# Patient Record
Sex: FEMALE | Race: WHITE | NOT HISPANIC OR LATINO | URBAN - METROPOLITAN AREA
[De-identification: names, ages, dates, MRNs, and addresses within clinical notes are randomized per-mention and may not be internally consistent; named-entity substitution may affect disease eponyms.]

---

## 2023-03-03 PROBLEM — M47.812 CERVICAL SPONDYLOSIS WITHOUT MYELOPATHY: Status: ACTIVE | Noted: 2023-03-03

## 2023-03-03 PROBLEM — F41.9 ANXIETY: Status: ACTIVE | Noted: 2023-03-03

## 2023-03-03 PROBLEM — M54.2 NECK PAIN, BILATERAL: Status: ACTIVE | Noted: 2023-03-03

## 2023-03-03 PROBLEM — M25.50 ARTHRALGIA OF MULTIPLE SITES: Status: ACTIVE | Noted: 2023-03-03

## 2023-03-03 PROBLEM — F32.A DEPRESSION: Status: ACTIVE | Noted: 2023-03-03

## 2023-03-03 PROBLEM — D64.9 ANEMIA: Status: ACTIVE | Noted: 2023-03-03

## 2023-03-03 PROBLEM — F98.8 ATTENTION DEFICIT DISORDER WITHOUT HYPERACTIVITY: Status: ACTIVE | Noted: 2023-03-03

## 2023-03-03 PROBLEM — M12.9 ARTHROPATHY: Status: ACTIVE | Noted: 2023-03-03

## 2023-03-03 PROBLEM — J02.0 ACUTE STREPTOCOCCAL PHARYNGITIS: Status: ACTIVE | Noted: 2023-03-03

## 2023-03-03 PROBLEM — E11.9 NEW ONSET TYPE 2 DIABETES MELLITUS (MULTI): Status: ACTIVE | Noted: 2023-03-03

## 2023-03-03 PROBLEM — E78.5 HYPERLIPEMIA: Status: ACTIVE | Noted: 2023-03-03

## 2023-03-03 PROBLEM — M51.26 PROTRUSION OF LUMBAR INTERVERTEBRAL DISC: Status: ACTIVE | Noted: 2023-03-03

## 2023-03-03 PROBLEM — J02.9 SORE THROAT: Status: ACTIVE | Noted: 2023-03-03

## 2023-03-03 PROBLEM — G62.9 NEUROPATHY: Status: ACTIVE | Noted: 2023-03-03

## 2023-03-03 PROBLEM — R76.8 ANA POSITIVE: Status: ACTIVE | Noted: 2023-03-03

## 2023-03-03 PROBLEM — I10 HYPERTENSION: Status: ACTIVE | Noted: 2023-03-03

## 2023-03-03 PROBLEM — F40.240 CLAUSTROPHOBIA: Status: ACTIVE | Noted: 2023-03-03

## 2023-03-03 PROBLEM — E55.9 VITAMIN D DEFICIENCY, UNSPECIFIED: Status: ACTIVE | Noted: 2023-03-03

## 2023-03-03 PROBLEM — M47.816 LUMBAR SPONDYLOSIS: Status: ACTIVE | Noted: 2023-03-03

## 2023-03-03 PROBLEM — M41.9 SCOLIOSIS: Status: ACTIVE | Noted: 2023-03-03

## 2023-03-03 PROBLEM — M54.6 THORACIC SPINE PAIN: Status: ACTIVE | Noted: 2023-03-03

## 2023-03-03 PROBLEM — M54.50 LUMBAR PAIN: Status: ACTIVE | Noted: 2023-03-03

## 2023-03-03 PROBLEM — M25.511 RIGHT SHOULDER PAIN: Status: ACTIVE | Noted: 2023-03-03

## 2023-03-03 RX ORDER — FLUOXETINE HYDROCHLORIDE 20 MG/1
1 CAPSULE ORAL
COMMUNITY
Start: 2014-01-29 | End: 2023-03-21 | Stop reason: SDUPTHER

## 2023-03-03 RX ORDER — PRAVASTATIN SODIUM 40 MG/1
1 TABLET ORAL DAILY
COMMUNITY
Start: 2021-07-23 | End: 2023-03-21 | Stop reason: SDUPTHER

## 2023-03-03 RX ORDER — BLOOD-GLUCOSE TRANSMITTER
EACH MISCELLANEOUS
COMMUNITY
End: 2024-05-30 | Stop reason: ALTCHOICE

## 2023-03-03 RX ORDER — METFORMIN HYDROCHLORIDE 500 MG/1
0.5 TABLET ORAL 2 TIMES DAILY
COMMUNITY
Start: 2021-10-21 | End: 2023-03-21 | Stop reason: SDUPTHER

## 2023-03-03 RX ORDER — BLOOD-GLUCOSE SENSOR
EACH MISCELLANEOUS
COMMUNITY
End: 2024-05-30 | Stop reason: ALTCHOICE

## 2023-03-03 RX ORDER — CELECOXIB 200 MG/1
1 CAPSULE ORAL 2 TIMES DAILY PRN
COMMUNITY
Start: 2021-10-21 | End: 2024-05-30 | Stop reason: ALTCHOICE

## 2023-03-03 RX ORDER — LOSARTAN POTASSIUM AND HYDROCHLOROTHIAZIDE 25; 100 MG/1; MG/1
1 TABLET ORAL
COMMUNITY
Start: 2019-10-16 | End: 2023-03-21 | Stop reason: SDUPTHER

## 2023-03-20 ENCOUNTER — LAB (OUTPATIENT)
Dept: LAB | Facility: LAB | Age: 63
End: 2023-03-20
Payer: COMMERCIAL

## 2023-03-20 DIAGNOSIS — E11.9 NEW ONSET TYPE 2 DIABETES MELLITUS (MULTI): ICD-10-CM

## 2023-03-20 DIAGNOSIS — E78.2 MIXED HYPERLIPIDEMIA: ICD-10-CM

## 2023-03-20 DIAGNOSIS — Z00.00 ANNUAL PHYSICAL EXAM: Primary | ICD-10-CM

## 2023-03-20 DIAGNOSIS — Z00.00 ANNUAL PHYSICAL EXAM: ICD-10-CM

## 2023-03-20 LAB
ALANINE AMINOTRANSFERASE (SGPT) (U/L) IN SER/PLAS: 20 U/L (ref 7–45)
ALBUMIN (G/DL) IN SER/PLAS: 4.2 G/DL (ref 3.4–5)
ALBUMIN (MG/L) IN URINE: 31.6 MG/L
ALBUMIN/CREATININE (UG/MG) IN URINE: 14.7 UG/MG CRT (ref 0–30)
ALKALINE PHOSPHATASE (U/L) IN SER/PLAS: 39 U/L (ref 33–136)
ANION GAP IN SER/PLAS: 12 MMOL/L (ref 10–20)
ASPARTATE AMINOTRANSFERASE (SGOT) (U/L) IN SER/PLAS: 17 U/L (ref 9–39)
BASOPHILS (10*3/UL) IN BLOOD BY AUTOMATED COUNT: 0.03 X10E9/L (ref 0–0.1)
BASOPHILS/100 LEUKOCYTES IN BLOOD BY AUTOMATED COUNT: 0.7 % (ref 0–2)
BILIRUBIN TOTAL (MG/DL) IN SER/PLAS: 0.7 MG/DL (ref 0–1.2)
CALCIUM (MG/DL) IN SER/PLAS: 9 MG/DL (ref 8.6–10.3)
CARBON DIOXIDE, TOTAL (MMOL/L) IN SER/PLAS: 29 MMOL/L (ref 21–32)
CHLORIDE (MMOL/L) IN SER/PLAS: 103 MMOL/L (ref 98–107)
CHOLESTEROL (MG/DL) IN SER/PLAS: 175 MG/DL (ref 0–199)
CHOLESTEROL IN HDL (MG/DL) IN SER/PLAS: 41.6 MG/DL
CHOLESTEROL/HDL RATIO: 4.2
CREATININE (MG/DL) IN SER/PLAS: 0.69 MG/DL (ref 0.5–1.05)
CREATININE (MG/DL) IN URINE: 215 MG/DL (ref 20–320)
EOSINOPHILS (10*3/UL) IN BLOOD BY AUTOMATED COUNT: 0.14 X10E9/L (ref 0–0.7)
EOSINOPHILS/100 LEUKOCYTES IN BLOOD BY AUTOMATED COUNT: 3.1 % (ref 0–6)
ERYTHROCYTE DISTRIBUTION WIDTH (RATIO) BY AUTOMATED COUNT: 13 % (ref 11.5–14.5)
ERYTHROCYTE MEAN CORPUSCULAR HEMOGLOBIN CONCENTRATION (G/DL) BY AUTOMATED: 31.3 G/DL (ref 32–36)
ERYTHROCYTE MEAN CORPUSCULAR VOLUME (FL) BY AUTOMATED COUNT: 90 FL (ref 80–100)
ERYTHROCYTES (10*6/UL) IN BLOOD BY AUTOMATED COUNT: 4.61 X10E12/L (ref 4–5.2)
GFR FEMALE: >90 ML/MIN/1.73M2
GLUCOSE (MG/DL) IN SER/PLAS: 98 MG/DL (ref 74–99)
HEMATOCRIT (%) IN BLOOD BY AUTOMATED COUNT: 41.6 % (ref 36–46)
HEMOGLOBIN (G/DL) IN BLOOD: 13 G/DL (ref 12–16)
IMMATURE GRANULOCYTES/100 LEUKOCYTES IN BLOOD BY AUTOMATED COUNT: 0.2 % (ref 0–0.9)
LDL: 94 MG/DL (ref 0–99)
LEUKOCYTES (10*3/UL) IN BLOOD BY AUTOMATED COUNT: 4.5 X10E9/L (ref 4.4–11.3)
LYMPHOCYTES (10*3/UL) IN BLOOD BY AUTOMATED COUNT: 1.16 X10E9/L (ref 1.2–4.8)
LYMPHOCYTES/100 LEUKOCYTES IN BLOOD BY AUTOMATED COUNT: 26 % (ref 13–44)
MONOCYTES (10*3/UL) IN BLOOD BY AUTOMATED COUNT: 0.42 X10E9/L (ref 0.1–1)
MONOCYTES/100 LEUKOCYTES IN BLOOD BY AUTOMATED COUNT: 9.4 % (ref 2–10)
NEUTROPHILS (10*3/UL) IN BLOOD BY AUTOMATED COUNT: 2.7 X10E9/L (ref 1.2–7.7)
NEUTROPHILS/100 LEUKOCYTES IN BLOOD BY AUTOMATED COUNT: 60.6 % (ref 40–80)
PLATELETS (10*3/UL) IN BLOOD AUTOMATED COUNT: 227 X10E9/L (ref 150–450)
POTASSIUM (MMOL/L) IN SER/PLAS: 3.7 MMOL/L (ref 3.5–5.3)
PROTEIN TOTAL: 6.6 G/DL (ref 6.4–8.2)
SODIUM (MMOL/L) IN SER/PLAS: 140 MMOL/L (ref 136–145)
THYROTROPIN (MIU/L) IN SER/PLAS BY DETECTION LIMIT <= 0.05 MIU/L: 1.55 MIU/L (ref 0.44–3.98)
TRIGLYCERIDE (MG/DL) IN SER/PLAS: 196 MG/DL (ref 0–149)
UREA NITROGEN (MG/DL) IN SER/PLAS: 18 MG/DL (ref 6–23)
VLDL: 39 MG/DL (ref 0–40)

## 2023-03-20 PROCEDURE — 85025 COMPLETE CBC W/AUTO DIFF WBC: CPT

## 2023-03-20 PROCEDURE — 80061 LIPID PANEL: CPT

## 2023-03-20 PROCEDURE — 82570 ASSAY OF URINE CREATININE: CPT

## 2023-03-20 PROCEDURE — 82043 UR ALBUMIN QUANTITATIVE: CPT

## 2023-03-20 PROCEDURE — 36415 COLL VENOUS BLD VENIPUNCTURE: CPT

## 2023-03-20 PROCEDURE — 84443 ASSAY THYROID STIM HORMONE: CPT

## 2023-03-20 PROCEDURE — 83036 HEMOGLOBIN GLYCOSYLATED A1C: CPT

## 2023-03-20 PROCEDURE — 80053 COMPREHEN METABOLIC PANEL: CPT

## 2023-03-21 ENCOUNTER — OFFICE VISIT (OUTPATIENT)
Dept: PRIMARY CARE | Facility: CLINIC | Age: 63
End: 2023-03-21
Payer: COMMERCIAL

## 2023-03-21 VITALS
HEART RATE: 70 BPM | WEIGHT: 146 LBS | DIASTOLIC BLOOD PRESSURE: 80 MMHG | SYSTOLIC BLOOD PRESSURE: 132 MMHG | BODY MASS INDEX: 25.06 KG/M2

## 2023-03-21 DIAGNOSIS — Z00.00 ENCOUNTER FOR ANNUAL HEALTH EXAMINATION: Primary | ICD-10-CM

## 2023-03-21 DIAGNOSIS — F34.1 PERSISTENT DEPRESSIVE DISORDER: ICD-10-CM

## 2023-03-21 DIAGNOSIS — E11.9 NEW ONSET TYPE 2 DIABETES MELLITUS (MULTI): ICD-10-CM

## 2023-03-21 DIAGNOSIS — F41.9 ANXIETY: ICD-10-CM

## 2023-03-21 DIAGNOSIS — I10 PRIMARY HYPERTENSION: ICD-10-CM

## 2023-03-21 DIAGNOSIS — E78.2 MIXED HYPERLIPIDEMIA: ICD-10-CM

## 2023-03-21 DIAGNOSIS — R19.7 DIARRHEA, UNSPECIFIED TYPE: ICD-10-CM

## 2023-03-21 DIAGNOSIS — Z12.31 BREAST CANCER SCREENING BY MAMMOGRAM: ICD-10-CM

## 2023-03-21 PROBLEM — J02.9 SORE THROAT: Status: RESOLVED | Noted: 2023-03-03 | Resolved: 2023-03-21

## 2023-03-21 PROBLEM — M54.50 LUMBAR PAIN: Status: RESOLVED | Noted: 2023-03-03 | Resolved: 2023-03-21

## 2023-03-21 PROBLEM — F40.240 CLAUSTROPHOBIA: Status: RESOLVED | Noted: 2023-03-03 | Resolved: 2023-03-21

## 2023-03-21 PROBLEM — J02.0 ACUTE STREPTOCOCCAL PHARYNGITIS: Status: RESOLVED | Noted: 2023-03-03 | Resolved: 2023-03-21

## 2023-03-21 PROBLEM — D64.9 ANEMIA: Status: RESOLVED | Noted: 2023-03-03 | Resolved: 2023-03-21

## 2023-03-21 PROBLEM — M54.2 NECK PAIN, BILATERAL: Status: RESOLVED | Noted: 2023-03-03 | Resolved: 2023-03-21

## 2023-03-21 PROBLEM — M54.6 THORACIC SPINE PAIN: Status: RESOLVED | Noted: 2023-03-03 | Resolved: 2023-03-21

## 2023-03-21 LAB
ESTIMATED AVERAGE GLUCOSE FOR HBA1C: 134 MG/DL
HEMOGLOBIN A1C/HEMOGLOBIN TOTAL IN BLOOD: 6.3 %

## 2023-03-21 PROCEDURE — 3075F SYST BP GE 130 - 139MM HG: CPT | Performed by: FAMILY MEDICINE

## 2023-03-21 PROCEDURE — 1036F TOBACCO NON-USER: CPT | Performed by: FAMILY MEDICINE

## 2023-03-21 PROCEDURE — 3079F DIAST BP 80-89 MM HG: CPT | Performed by: FAMILY MEDICINE

## 2023-03-21 PROCEDURE — 3044F HG A1C LEVEL LT 7.0%: CPT | Performed by: FAMILY MEDICINE

## 2023-03-21 PROCEDURE — 93000 ELECTROCARDIOGRAM COMPLETE: CPT | Performed by: FAMILY MEDICINE

## 2023-03-21 PROCEDURE — 99214 OFFICE O/P EST MOD 30 MIN: CPT | Performed by: FAMILY MEDICINE

## 2023-03-21 PROCEDURE — 99396 PREV VISIT EST AGE 40-64: CPT | Performed by: FAMILY MEDICINE

## 2023-03-21 RX ORDER — METFORMIN HYDROCHLORIDE 500 MG/1
250 TABLET ORAL
Qty: 90 TABLET | Refills: 1 | Status: SHIPPED | OUTPATIENT
Start: 2023-03-21 | End: 2023-11-28 | Stop reason: SDUPTHER

## 2023-03-21 RX ORDER — PRAVASTATIN SODIUM 40 MG/1
40 TABLET ORAL DAILY
Qty: 90 TABLET | Refills: 1 | Status: SHIPPED | OUTPATIENT
Start: 2023-03-21 | End: 2023-11-28 | Stop reason: SDUPTHER

## 2023-03-21 RX ORDER — FLUOXETINE HYDROCHLORIDE 20 MG/1
20 CAPSULE ORAL
Qty: 90 CAPSULE | Refills: 1 | Status: SHIPPED | OUTPATIENT
Start: 2023-03-21 | End: 2023-10-24 | Stop reason: SDUPTHER

## 2023-03-21 RX ORDER — LOSARTAN POTASSIUM AND HYDROCHLOROTHIAZIDE 25; 100 MG/1; MG/1
1 TABLET ORAL
Qty: 90 TABLET | Refills: 1 | Status: SHIPPED | OUTPATIENT
Start: 2023-03-21 | End: 2023-10-30 | Stop reason: SDUPTHER

## 2023-03-21 ASSESSMENT — PATIENT HEALTH QUESTIONNAIRE - PHQ9
2. FEELING DOWN, DEPRESSED OR HOPELESS: NOT AT ALL
SUM OF ALL RESPONSES TO PHQ9 QUESTIONS 1 AND 2: 0
1. LITTLE INTEREST OR PLEASURE IN DOING THINGS: NOT AT ALL

## 2023-03-21 NOTE — RESULT ENCOUNTER NOTE
Results were reviewed today with Dr. Scott at her office visit/physical examination.  See chart note for complete details

## 2023-03-21 NOTE — PROGRESS NOTES
Subjective   Haleigh Pretty is a 62 y.o. female who presents for annual health maintenance.    HPI  Health Maintenance:   Pap: believes this was done about 3-5 years ago but cannot recall  Mammogram: last done 2021  Colonoscopy: last done 2015 with 10 year recommended repeat   Immunizations: due for shingles   Sees dentist and eye doctor regularly   EtOH use: 4 drinks per week   Never smoker    Diarrhea:  Reports diarrhea for last 2 weeks   Currently having BMs about twice daily   Denies abdominal pain, cramping, nausea, vomiting, melena or hematochezia   Notes fluctuation between diarrhea and constipation     ROS:   All pertinent positive symptoms are included in the history of present illness.  All other systems have been reviewed and are negative and noncontributory to this patient's current ailments.    Objective     /80   Pulse 70   Wt 66.2 kg (146 lb)   BMI 25.06 kg/m²     Physical Exam  CONSTITUTIONAL - well nourished, well developed, looks like stated age, in no acute distress, not ill-appearing, and not tired appearing  SKIN - normal skin color and pigmentation, normal skin turgor without rash, lesions, or nodules visualized  HEAD - no trauma, normocephalic  EYES - pupils are equal and reactive to light, extraocular muscles are intact, and normal external exam  CHEST - clear to auscultation, no wheezing, no crackles and no rales, good effort  CARDIAC - regular rate and regular rhythm, no skipped beats, no murmur  ABDOMEN - no organomegaly, soft, nontender, nondistended, normal bowel sounds, no guarding/rebound/rigidity, negative McBurney sign and negative Torres sign  EXTREMITIES - no obvious or evident edema, no obvious or evident deformities  NEUROLOGICAL - normal gait, normal balance, normal motor, no ataxia, alert, oriented   PSYCHIATRIC - alert, pleasant and cordial, age-appropriate  IMMUNOLOGIC - no cervical lymphadenopathy    Assessment/Plan   Encounter for annual health examination  Complete  history and physical examination was performed  EKG reveals sinus rhythm without acute changes  Breast cancer screening by mammogram  -     BI mammo bilateral screening tomosynthesis; Future  New onset type 2 diabetes mellitus (CMS/HCC)  Comments:  Well controlled with HgbA1c of 6.3  Nutrition referral placed per your request for further dietary advice  Orders:  -     Referral to Nutrition Services; Future  Diarrhea, unspecified type  Comments:  Discussed use of OTC fiber to help form and bulk the stool. Possibly due to IBS, therefore discussed trial of Low Fodmap diet. Hand out provided today    In addition to the diagnoses above, medication refills were sent for anxiety and depression, hypertension, and hyperlipidemia without change.  Blood work results were reviewed, BP looks to be normotensive, so no changes were thought to be warranted at this time

## 2023-03-27 ENCOUNTER — APPOINTMENT (OUTPATIENT)
Dept: PRIMARY CARE | Facility: CLINIC | Age: 63
End: 2023-03-27
Payer: COMMERCIAL

## 2023-10-24 DIAGNOSIS — F34.1 PERSISTENT DEPRESSIVE DISORDER: ICD-10-CM

## 2023-10-24 DIAGNOSIS — F41.9 ANXIETY: ICD-10-CM

## 2023-10-24 RX ORDER — FLUOXETINE HYDROCHLORIDE 20 MG/1
20 CAPSULE ORAL
Qty: 30 CAPSULE | Refills: 0 | Status: SHIPPED | OUTPATIENT
Start: 2023-10-24 | End: 2023-10-30

## 2023-10-30 ENCOUNTER — OFFICE VISIT (OUTPATIENT)
Dept: PRIMARY CARE | Facility: CLINIC | Age: 63
End: 2023-10-30
Payer: COMMERCIAL

## 2023-10-30 DIAGNOSIS — E11.9 NEW ONSET TYPE 2 DIABETES MELLITUS (MULTI): Primary | ICD-10-CM

## 2023-10-30 DIAGNOSIS — Z23 INFLUENZA VACCINE NEEDED: ICD-10-CM

## 2023-10-30 DIAGNOSIS — E78.2 MIXED HYPERLIPIDEMIA: ICD-10-CM

## 2023-10-30 DIAGNOSIS — F41.9 ANXIETY: ICD-10-CM

## 2023-10-30 DIAGNOSIS — I10 PRIMARY HYPERTENSION: ICD-10-CM

## 2023-10-30 PROCEDURE — 90471 IMMUNIZATION ADMIN: CPT | Performed by: FAMILY MEDICINE

## 2023-10-30 PROCEDURE — 99214 OFFICE O/P EST MOD 30 MIN: CPT | Performed by: FAMILY MEDICINE

## 2023-10-30 PROCEDURE — 3044F HG A1C LEVEL LT 7.0%: CPT | Performed by: FAMILY MEDICINE

## 2023-10-30 PROCEDURE — 90686 IIV4 VACC NO PRSV 0.5 ML IM: CPT | Performed by: FAMILY MEDICINE

## 2023-10-30 PROCEDURE — 1036F TOBACCO NON-USER: CPT | Performed by: FAMILY MEDICINE

## 2023-10-30 RX ORDER — ESCITALOPRAM OXALATE 10 MG/1
10 TABLET ORAL DAILY
Qty: 30 TABLET | Refills: 0 | Status: SHIPPED | OUTPATIENT
Start: 2023-10-30 | End: 2023-11-14 | Stop reason: SINTOL

## 2023-10-30 RX ORDER — LOSARTAN POTASSIUM AND HYDROCHLOROTHIAZIDE 25; 100 MG/1; MG/1
1 TABLET ORAL
Qty: 90 TABLET | Refills: 1 | Status: SHIPPED | OUTPATIENT
Start: 2023-10-30 | End: 2024-05-06 | Stop reason: SDUPTHER

## 2023-10-30 NOTE — ASSESSMENT & PLAN NOTE
Please have lab work done at your earliest convenience, we will call with results and make treatment recommendations accordingly  Continue pravastatin 40 mg for now

## 2023-10-30 NOTE — ASSESSMENT & PLAN NOTE
We will check an A1c with labs today and discuss the need to continue metformin  If you are still in the prediabetic range, I feel it is reasonable to treat with diet and exercise but we can also discuss initiation of GLP at that time as well if you are interested

## 2023-10-30 NOTE — PROGRESS NOTES
Subjective   Patient ID: Haleigh Pretty is a 63 y.o. female who presents for Anxiety, Hypertension, Hyperlipidemia, and Diabetes.    HPI  1.  Anxiety  Currently on Prozac 20 mg daily  Unsure if this medication helps her at all and interested in stopping it  However, feels like she should probably be on something for her anxiety    2.  Hypertension  Well-controlled on intake today  Taking losartan/hydrochlorothiazide 100-25 mg daily, tolerates well without side effects, requesting refill  Denies cardiopulmonary symptoms    3.  Hyperlipidemia  Triglycerides elevated at 196 from lipid panel in March 2023, otherwise WNL  Taking pravastatin 40 mg daily, tolerates well side effects, requesting refill    4.  T2DM  A1c 6.3% back in March 2023, previously 6.5%  Taking metformin 500 mg daily  Denies side effects but would like to stop taking this medication in favor of diet and exercise    Review of Systems  All pertinent positive symptoms are included in the history of present illness.    All other systems have been reviewed and are negative and noncontributory to this patient's current ailments.    Past Medical History:   Diagnosis Date    Abnormal findings on diagnostic imaging of other specified body structures     Abnormal radiographic examination    Influenza due to other identified influenza virus with other respiratory manifestations 01/05/2018    Influenza A    Laceration without foreign body of right hand, initial encounter 11/15/2017    Laceration of right hand without foreign body, initial encounter    Myositis, unspecified 07/02/2015    Myofascitis    Pain in thoracic spine 07/02/2015    Pain in thoracic spine    Personal history of other diseases of the digestive system 07/27/2018    History of fatty infiltration of liver    Personal history of other endocrine, nutritional and metabolic disease     History of hyperlipidemia    Personal history of other specified conditions 08/25/2017    History of palpitations     Sprain of unspecified parts of thorax, initial encounter 07/02/2015    Thoracic sprain    Strain of muscle, fascia and tendon at neck level, initial encounter 11/12/2019    Cervical strain     Past Surgical History:   Procedure Laterality Date    OTHER SURGICAL HISTORY  10/15/2014    Mastectomy For Gynecomastia Bilateral    TONSILLECTOMY  10/15/2014    Tonsillectomy     Social History     Tobacco Use    Smoking status: Never    Smokeless tobacco: Never     Family History   Problem Relation Name Age of Onset    JAKE disease Mother      Lymphoma Father       Allergies   Allergen Reactions    Propoxyphene N-Acetaminophen Unknown     Immunization History   Administered Date(s) Administered    Flu vaccine (IIV4), preservative free *Check age/dose* 10/30/2023    Influenza, injectable, MDCK, preservative free, quadrivalent 10/18/2018, 09/20/2019, 10/13/2020    Influenza, seasonal, injectable 10/30/1995, 10/20/1997, 10/24/2022    [employer] SARS-CoV-2 Vaccination 03/17/2021    Moderna SARS-CoV-2 Vaccination 10/24/2022    Pfizer Purple Cap SARS-CoV-2 11/27/2021    Tdap vaccine, age 7 year and older (BOOSTRIX) 01/24/2017, 08/03/2022     Current Outpatient Medications   Medication Instructions    blood-glucose sensor (Dexcom G6 Sensor) device Use as directed    celecoxib (CeleBREX) 200 mg capsule 1 capsule, oral, 2 times daily PRN    Dexcom G4 platinum transmitter (Dexcom G6 Transmitter) device Use as directed    escitalopram (LEXAPRO) 10 mg, oral, Daily    FreeStyle Stacy sensor system kit Use as directed    losartan-hydrochlorothiazide (Hyzaar) 100-25 mg tablet 1 tablet, oral, Every Day    metFORMIN (GLUCOPHAGE) 250 mg, oral, 2 times daily with meals    pravastatin (PRAVACHOL) 40 mg, oral, Daily, As directed     Objective   Visit Vitals  Smoking Status Never     No visits with results within 1 Month(s) from this visit.   Latest known visit with results is:   Lab on 03/20/2023   Component Date Value    TSH 03/20/2023 1.55      Cholesterol 03/20/2023 175     HDL 03/20/2023 41.6     Cholesterol/HDL Ratio 03/20/2023 4.2     LDL 03/20/2023 94     VLDL 03/20/2023 39     Triglycerides 03/20/2023 196 (H)     Glucose 03/20/2023 98     Sodium 03/20/2023 140     Potassium 03/20/2023 3.7     Chloride 03/20/2023 103     Bicarbonate 03/20/2023 29     Anion Gap 03/20/2023 12     Urea Nitrogen 03/20/2023 18     Creatinine 03/20/2023 0.69     GFR Female 03/20/2023 >90     Calcium 03/20/2023 9.0     Albumin 03/20/2023 4.2     Alkaline Phosphatase 03/20/2023 39     Total Protein 03/20/2023 6.6     AST 03/20/2023 17     Total Bilirubin 03/20/2023 0.7     ALT (SGPT) 03/20/2023 20     WBC 03/20/2023 4.5     RBC 03/20/2023 4.61     Hemoglobin 03/20/2023 13.0     Hematocrit 03/20/2023 41.6     MCV 03/20/2023 90     MCHC 03/20/2023 31.3 (L)     Platelets 03/20/2023 227     RDW 03/20/2023 13.0     Neutrophils % 03/20/2023 60.6     Immature Granulocytes %,* 03/20/2023 0.2     Lymphocytes % 03/20/2023 26.0     Monocytes % 03/20/2023 9.4     Eosinophils % 03/20/2023 3.1     Basophils % 03/20/2023 0.7     Neutrophils Absolute 03/20/2023 2.70     Lymphocytes Absolute 03/20/2023 1.16 (L)     Monocytes Absolute 03/20/2023 0.42     Eosinophils Absolute 03/20/2023 0.14     Basophils Absolute 03/20/2023 0.03     Hemoglobin A1C 03/20/2023 6.3 (A)     Estimated Average Glucose 03/20/2023 134     ALBUMIN (MG/L) IN URINE 03/20/2023 31.6     Albumin/Creatine Ratio 03/20/2023 14.7     Creatinine, Urine 03/20/2023 215.0      Physical Exam  CONSTITUTIONAL - well nourished, well developed, looks like stated age, in no acute distress, not ill-appearing, and not tired appearing  SKIN - normal skin color and pigmentation, normal skin turgor without rash, lesions, or nodules visualized  HEAD - no trauma, normocephalic  CHEST - clear to auscultation, no wheezing, no crackles and no rales, good effort  CARDIAC - regular rate and regular rhythm, no skipped beats, no murmur  EXTREMITIES -  no edema, no deformities  NEUROLOGICAL - normal gait, normal balance, normal motor  PSYCHIATRIC - alert, pleasant and cordial, age-appropriate     Assessment/Plan   Problem List Items Addressed This Visit       Anxiety     We discontinued your Prozac today  I would like you to start taking Lexapro 10 mg instead and follow-up with us in 3 to 4 weeks         Relevant Medications    escitalopram (Lexapro) 10 mg tablet    Hyperlipemia     Please have lab work done at your earliest convenience, we will call with results and make treatment recommendations accordingly  Continue pravastatin 40 mg for now         Relevant Orders    Comprehensive Metabolic Panel    Lipid Panel    Hypertension     Stable, no changes  Refill sent to pharmacy         Relevant Medications    losartan-hydrochlorothiazide (Hyzaar) 100-25 mg tablet    Other Relevant Orders    Comprehensive Metabolic Panel    Lipid Panel    New onset type 2 diabetes mellitus (CMS/HCC) - Primary     We will check an A1c with labs today and discuss the need to continue metformin  If you are still in the prediabetic range, I feel it is reasonable to treat with diet and exercise but we can also discuss initiation of GLP at that time as well if you are interested         Relevant Medications    losartan-hydrochlorothiazide (Hyzaar) 100-25 mg tablet    Other Relevant Orders    Hemoglobin A1C    Comprehensive Metabolic Panel    Lipid Panel    Influenza vaccine needed     Influenza vaccine administered in office today         Relevant Orders    Flu vaccine (IIV4) age 6 months and greater, preservative free (Completed)

## 2023-10-30 NOTE — ASSESSMENT & PLAN NOTE
We discontinued your Prozac today  I would like you to start taking Lexapro 10 mg instead and follow-up with us in 3 to 4 weeks

## 2023-11-14 ENCOUNTER — TELEMEDICINE (OUTPATIENT)
Dept: PRIMARY CARE | Facility: CLINIC | Age: 63
End: 2023-11-14
Payer: COMMERCIAL

## 2023-11-14 DIAGNOSIS — F32.A ANXIETY AND DEPRESSION: ICD-10-CM

## 2023-11-14 DIAGNOSIS — F41.9 ANXIETY AND DEPRESSION: ICD-10-CM

## 2023-11-14 DIAGNOSIS — J06.9 BACTERIAL URI: Primary | ICD-10-CM

## 2023-11-14 DIAGNOSIS — B96.89 BACTERIAL URI: Primary | ICD-10-CM

## 2023-11-14 PROCEDURE — 99214 OFFICE O/P EST MOD 30 MIN: CPT | Performed by: FAMILY MEDICINE

## 2023-11-14 RX ORDER — FLUOXETINE HYDROCHLORIDE 40 MG/1
40 CAPSULE ORAL DAILY
Qty: 90 CAPSULE | Refills: 1 | Status: SHIPPED | OUTPATIENT
Start: 2023-11-14 | End: 2024-05-30 | Stop reason: SDUPTHER

## 2023-11-14 RX ORDER — AMOXICILLIN AND CLAVULANATE POTASSIUM 875; 125 MG/1; MG/1
875 TABLET, FILM COATED ORAL 2 TIMES DAILY
Qty: 14 TABLET | Refills: 0 | Status: SHIPPED | OUTPATIENT
Start: 2023-11-18 | End: 2023-11-25

## 2023-11-14 NOTE — ASSESSMENT & PLAN NOTE
Augmentin postdated for Saturday  You may begin abx at that time If symptoms do not improve  Continue symptomatic management at home

## 2023-11-14 NOTE — ASSESSMENT & PLAN NOTE
Discontinue Lexapro in favor of fluoxetine 40 mg daily  Please follow-up in the office within the next 3 to 5 weeks to ensure efficacy and tolerability

## 2023-11-14 NOTE — ASSESSMENT & PLAN NOTE
Difficult to know whether or not this is bacteria or virus, but I have postdated the antibiotic prescription for you to obtain this weekend if there has been no improvement with the use of OTC remedies    Risks, benefits, and options of treatment(s) were discussed after reviewing all current medication(s) and drug allergy(ies)  I opted for the treatment that we discussed with instructions on the medication use for your underlying medical ailment(s)  I encouraged supportive care such as rest, fluids and Advil/Tylenol as warranted  Return to the clinic in 7-10 days or sooner if symptoms worsen or persist as we will then further evaluate

## 2023-11-14 NOTE — PROGRESS NOTES
Subjective   Patient ID: Haleigh Pretty is a 63 y.o. female who presents for Cough, Sore Throat (/), and Depression.    HPI  1.  Depression.  Haleigh was previously prescribed Fluoxetine 20mg daily but underwent a medication adjustment two weeks ago, transitioning to Lexapro 10mg daily. However, she reports dissatisfaction with Lexapro due to perceived weight gain attributed to an increased appetite.    It's important to note that Haleigh denies any thoughts of self-harm or harm to others (SI/HI) at this time.    2.  URI symptoms.  Haleigh returned from a trip to [facility] four days ago and began experiencing symptoms starting Saturday evening. She initially presented with a cough that worsened through Sunday night. Over the course of these days, she has reported intermittent fevers, reaching a peak of 103 yesterday, and maintaining a temperature of 100.5 today. In addition to the fever, she describes a generalized sense of malaise.    Importantly, Haleigh denies symptoms such as nausea, vomiting, shortness of breath (SOB), or chest pain (CP). She has undergone testing for COVID, and the results returned negative. She has been managing her symptoms with Advil.    Review of Systems  All pertinent positive symptoms are included in the history of present illness.    All other systems have been reviewed and are negative and noncontributory to this patient's current ailments.     Allergies   Allergen Reactions    Propoxyphene N-Acetaminophen Unknown        Immunization History   Administered Date(s) Administered    Flu vaccine (IIV4), preservative free *Check age/dose* 10/30/2023    Influenza, injectable, MDCK, preservative free, quadrivalent 10/18/2018, 09/20/2019, 10/13/2020    Influenza, seasonal, injectable 10/30/1995, 10/20/1997, 10/24/2022    [employer] SARS-CoV-2 Vaccination 03/17/2021    Moderna SARS-CoV-2 Vaccination 10/24/2022    Pfizer Purple Cap SARS-CoV-2 11/27/2021    Tdap vaccine, age 7 year and older (BOOSTRIX) 01/24/2017,  08/03/2022       Objective   There were no vitals filed for this visit.    Physical Exam  CONSTITUTIONAL - well nourished, well developed, looks like stated age, in no acute distress, not ill-appearing, and not tired appearing  SKIN - normal skin color and pigmentation  EYES - normal external exam  LUNGS - breathing comfortably, no dyspnea  EXTREMITIES - no deformities noticeable  NEUROLOGICAL - oriented and no focal signs  PSYCHIATRIC - alert, pleasant and cordial, age-appropriate, not anxious or depressed appearing     Assessment/Plan   Problem List Items Addressed This Visit       Anxiety and depression     Discontinue Lexapro in favor of fluoxetine 40 mg daily  Please follow-up in the office within the next 3 to 5 weeks to ensure efficacy and tolerability         Relevant Medications    FLUoxetine (PROzac) 40 mg capsule    Bacterial URI - Primary     Difficult to know whether or not this is bacteria or virus, but I have postdated the antibiotic prescription for you to obtain this weekend if there has been no improvement with the use of OTC remedies    Risks, benefits, and options of treatment(s) were discussed after reviewing all current medication(s) and drug allergy(ies)  I opted for the treatment that we discussed with instructions on the medication use for your underlying medical ailment(s)  I encouraged supportive care such as rest, fluids and Advil/Tylenol as warranted  Return to the clinic in 7-10 days or sooner if symptoms worsen or persist as we will then further evaluate         Relevant Medications    amoxicillin-pot clavulanate (Augmentin) 875-125 mg tablet (Start on 11/18/2023)

## 2023-11-28 DIAGNOSIS — E78.2 MIXED HYPERLIPIDEMIA: ICD-10-CM

## 2023-11-28 DIAGNOSIS — E11.9 NEW ONSET TYPE 2 DIABETES MELLITUS (MULTI): ICD-10-CM

## 2023-11-28 RX ORDER — METFORMIN HYDROCHLORIDE 500 MG/1
250 TABLET ORAL
Qty: 30 TABLET | Refills: 0 | Status: SHIPPED | OUTPATIENT
Start: 2023-11-28 | End: 2024-01-07

## 2023-11-28 RX ORDER — PRAVASTATIN SODIUM 40 MG/1
40 TABLET ORAL DAILY
Qty: 30 TABLET | Refills: 0 | Status: SHIPPED | OUTPATIENT
Start: 2023-11-28 | End: 2024-01-05 | Stop reason: SDUPTHER

## 2024-01-04 ENCOUNTER — LAB (OUTPATIENT)
Dept: LAB | Facility: LAB | Age: 64
End: 2024-01-04
Payer: COMMERCIAL

## 2024-01-04 DIAGNOSIS — E11.9 NEW ONSET TYPE 2 DIABETES MELLITUS (MULTI): ICD-10-CM

## 2024-01-04 DIAGNOSIS — I10 PRIMARY HYPERTENSION: ICD-10-CM

## 2024-01-04 DIAGNOSIS — E78.2 MIXED HYPERLIPIDEMIA: ICD-10-CM

## 2024-01-04 LAB
ALBUMIN SERPL BCP-MCNC: 4.5 G/DL (ref 3.4–5)
ALP SERPL-CCNC: 37 U/L (ref 33–136)
ALT SERPL W P-5'-P-CCNC: 27 U/L (ref 7–45)
ANION GAP SERPL CALC-SCNC: 11 MMOL/L (ref 10–20)
AST SERPL W P-5'-P-CCNC: 21 U/L (ref 9–39)
BILIRUB SERPL-MCNC: 0.8 MG/DL (ref 0–1.2)
BUN SERPL-MCNC: 17 MG/DL (ref 6–23)
CALCIUM SERPL-MCNC: 9.6 MG/DL (ref 8.6–10.3)
CHLORIDE SERPL-SCNC: 102 MMOL/L (ref 98–107)
CHOLEST SERPL-MCNC: 168 MG/DL (ref 0–199)
CHOLESTEROL/HDL RATIO: 4
CO2 SERPL-SCNC: 30 MMOL/L (ref 21–32)
CREAT SERPL-MCNC: 0.66 MG/DL (ref 0.5–1.05)
GFR SERPL CREATININE-BSD FRML MDRD: >90 ML/MIN/1.73M*2
GLUCOSE SERPL-MCNC: 98 MG/DL (ref 74–99)
HDLC SERPL-MCNC: 42.2 MG/DL
LDLC SERPL CALC-MCNC: 100 MG/DL
NON HDL CHOLESTEROL: 126 MG/DL (ref 0–149)
POTASSIUM SERPL-SCNC: 3.8 MMOL/L (ref 3.5–5.3)
PROT SERPL-MCNC: 7 G/DL (ref 6.4–8.2)
SODIUM SERPL-SCNC: 139 MMOL/L (ref 136–145)
TRIGL SERPL-MCNC: 130 MG/DL (ref 0–149)
VLDL: 26 MG/DL (ref 0–40)

## 2024-01-04 PROCEDURE — 83036 HEMOGLOBIN GLYCOSYLATED A1C: CPT

## 2024-01-04 PROCEDURE — 80061 LIPID PANEL: CPT

## 2024-01-04 PROCEDURE — 80053 COMPREHEN METABOLIC PANEL: CPT

## 2024-01-04 PROCEDURE — 36415 COLL VENOUS BLD VENIPUNCTURE: CPT

## 2024-01-05 DIAGNOSIS — E78.2 MIXED HYPERLIPIDEMIA: ICD-10-CM

## 2024-01-05 LAB
EST. AVERAGE GLUCOSE BLD GHB EST-MCNC: 137 MG/DL
HBA1C MFR BLD: 6.4 %

## 2024-01-05 RX ORDER — PRAVASTATIN SODIUM 40 MG/1
40 TABLET ORAL DAILY
Qty: 90 TABLET | Refills: 1 | Status: SHIPPED | OUTPATIENT
Start: 2024-01-05 | End: 2024-05-30 | Stop reason: SDUPTHER

## 2024-01-05 NOTE — RESULT ENCOUNTER NOTE
1. Hemoglobin A1c: Your results have been stable at 6.4, with previous results at 6.3 and 6.5. Although it's stable, it's not low enough for us to consider discontinuing medication. If you're open to trying different medication, other than Metformin, please let me know. We can consider the use of Ozempic or Mounjaro, both of which have shown effective results for diabetes patients. However, we would need to check the coverage with your insurance.    2. Cholesterol: Your recent results show a significant improvement with your triglycerides down from 196 to 130. I recommend that you continue using Pravastatin as prescribed.    3. Kidney, liver, and electrolytes: All results are within normal ranges.    Based on these findings, I recommend that we continue with your current regimen. However, before I send over any medication, I need your input on how you would like to proceed, especially since we don't have a follow-up appointment scheduled in the office to discuss this.

## 2024-01-07 DIAGNOSIS — E11.9 NEW ONSET TYPE 2 DIABETES MELLITUS (MULTI): Primary | ICD-10-CM

## 2024-02-19 DIAGNOSIS — E11.9 NEW ONSET TYPE 2 DIABETES MELLITUS (MULTI): ICD-10-CM

## 2024-03-08 DIAGNOSIS — E11.9 NEW ONSET TYPE 2 DIABETES MELLITUS (MULTI): Primary | ICD-10-CM

## 2024-03-08 RX ORDER — METFORMIN HYDROCHLORIDE 500 MG/1
250 TABLET ORAL
Qty: 90 TABLET | Refills: 1 | Status: SHIPPED | OUTPATIENT
Start: 2024-03-08 | End: 2024-05-30 | Stop reason: ALTCHOICE

## 2024-05-06 DIAGNOSIS — I10 PRIMARY HYPERTENSION: ICD-10-CM

## 2024-05-06 DIAGNOSIS — E11.9 NEW ONSET TYPE 2 DIABETES MELLITUS (MULTI): ICD-10-CM

## 2024-05-06 RX ORDER — LOSARTAN POTASSIUM AND HYDROCHLOROTHIAZIDE 25; 100 MG/1; MG/1
1 TABLET ORAL
Qty: 30 TABLET | Refills: 0 | Status: SHIPPED | OUTPATIENT
Start: 2024-05-06 | End: 2024-05-30 | Stop reason: SDUPTHER

## 2024-05-29 ENCOUNTER — LAB (OUTPATIENT)
Dept: LAB | Facility: LAB | Age: 64
End: 2024-05-29
Payer: COMMERCIAL

## 2024-05-29 DIAGNOSIS — Z00.00 ENCOUNTER FOR GENERAL ADULT MEDICAL EXAMINATION WITHOUT ABNORMAL FINDINGS: Primary | ICD-10-CM

## 2024-05-29 DIAGNOSIS — E78.2 MIXED HYPERLIPIDEMIA: ICD-10-CM

## 2024-05-29 DIAGNOSIS — E11.9 NEW ONSET TYPE 2 DIABETES MELLITUS (MULTI): ICD-10-CM

## 2024-05-29 DIAGNOSIS — I10 PRIMARY HYPERTENSION: ICD-10-CM

## 2024-05-29 LAB
ALBUMIN SERPL BCP-MCNC: 4.2 G/DL (ref 3.4–5)
ALP SERPL-CCNC: 33 U/L (ref 33–136)
ALT SERPL W P-5'-P-CCNC: 17 U/L (ref 7–45)
ANION GAP SERPL CALC-SCNC: 9 MMOL/L (ref 10–20)
AST SERPL W P-5'-P-CCNC: 16 U/L (ref 9–39)
BILIRUB SERPL-MCNC: 0.6 MG/DL (ref 0–1.2)
BUN SERPL-MCNC: 18 MG/DL (ref 6–23)
CALCIUM SERPL-MCNC: 9.1 MG/DL (ref 8.6–10.3)
CHLORIDE SERPL-SCNC: 103 MMOL/L (ref 98–107)
CHOLEST SERPL-MCNC: 156 MG/DL (ref 0–199)
CHOLESTEROL/HDL RATIO: 3.5
CO2 SERPL-SCNC: 30 MMOL/L (ref 21–32)
CREAT SERPL-MCNC: 0.7 MG/DL (ref 0.5–1.05)
EGFRCR SERPLBLD CKD-EPI 2021: >90 ML/MIN/1.73M*2
GLUCOSE SERPL-MCNC: 88 MG/DL (ref 74–99)
HDLC SERPL-MCNC: 44.6 MG/DL
LDLC SERPL CALC-MCNC: 91 MG/DL
NON HDL CHOLESTEROL: 111 MG/DL (ref 0–149)
POTASSIUM SERPL-SCNC: 3.7 MMOL/L (ref 3.5–5.3)
PROT SERPL-MCNC: 6.5 G/DL (ref 6.4–8.2)
SODIUM SERPL-SCNC: 138 MMOL/L (ref 136–145)
TRIGL SERPL-MCNC: 100 MG/DL (ref 0–149)
VLDL: 20 MG/DL (ref 0–40)

## 2024-05-29 PROCEDURE — 36415 COLL VENOUS BLD VENIPUNCTURE: CPT

## 2024-05-29 PROCEDURE — 80053 COMPREHEN METABOLIC PANEL: CPT

## 2024-05-29 PROCEDURE — 83036 HEMOGLOBIN GLYCOSYLATED A1C: CPT

## 2024-05-29 PROCEDURE — 80061 LIPID PANEL: CPT

## 2024-05-30 ENCOUNTER — OFFICE VISIT (OUTPATIENT)
Dept: PRIMARY CARE | Facility: CLINIC | Age: 64
End: 2024-05-30
Payer: COMMERCIAL

## 2024-05-30 VITALS
WEIGHT: 140 LBS | SYSTOLIC BLOOD PRESSURE: 120 MMHG | HEIGHT: 64 IN | HEART RATE: 77 BPM | DIASTOLIC BLOOD PRESSURE: 76 MMHG | BODY MASS INDEX: 23.9 KG/M2

## 2024-05-30 DIAGNOSIS — Z00.00 PHYSICAL EXAM, ANNUAL: Primary | ICD-10-CM

## 2024-05-30 DIAGNOSIS — E11.9 NEW ONSET TYPE 2 DIABETES MELLITUS (MULTI): ICD-10-CM

## 2024-05-30 DIAGNOSIS — Z23 NEED FOR SHINGLES VACCINE: ICD-10-CM

## 2024-05-30 DIAGNOSIS — I10 PRIMARY HYPERTENSION: ICD-10-CM

## 2024-05-30 DIAGNOSIS — E78.2 MIXED HYPERLIPIDEMIA: ICD-10-CM

## 2024-05-30 DIAGNOSIS — F32.A ANXIETY AND DEPRESSION: ICD-10-CM

## 2024-05-30 DIAGNOSIS — F41.9 ANXIETY AND DEPRESSION: ICD-10-CM

## 2024-05-30 PROBLEM — J06.9 BACTERIAL URI: Status: RESOLVED | Noted: 2023-11-14 | Resolved: 2024-05-30

## 2024-05-30 PROBLEM — B96.89 BACTERIAL URI: Status: RESOLVED | Noted: 2023-11-14 | Resolved: 2024-05-30

## 2024-05-30 LAB
EST. AVERAGE GLUCOSE BLD GHB EST-MCNC: 126 MG/DL
HBA1C MFR BLD: 6 %
POC APPEARANCE, URINE: CLEAR
POC BILIRUBIN, URINE: NEGATIVE
POC BLOOD, URINE: NEGATIVE
POC COLOR, URINE: YELLOW
POC GLUCOSE, URINE: NEGATIVE MG/DL
POC KETONES, URINE: NEGATIVE MG/DL
POC LEUKOCYTES, URINE: NEGATIVE
POC NITRITE,URINE: NEGATIVE
POC PH, URINE: 6 PH
POC PROTEIN, URINE: NEGATIVE MG/DL
POC SPECIFIC GRAVITY, URINE: 1.01
POC UROBILINOGEN, URINE: 0.2 EU/DL

## 2024-05-30 PROCEDURE — 99396 PREV VISIT EST AGE 40-64: CPT | Performed by: FAMILY MEDICINE

## 2024-05-30 PROCEDURE — 99214 OFFICE O/P EST MOD 30 MIN: CPT | Performed by: FAMILY MEDICINE

## 2024-05-30 PROCEDURE — 3048F LDL-C <100 MG/DL: CPT | Performed by: FAMILY MEDICINE

## 2024-05-30 PROCEDURE — 3044F HG A1C LEVEL LT 7.0%: CPT | Performed by: FAMILY MEDICINE

## 2024-05-30 PROCEDURE — 93000 ELECTROCARDIOGRAM COMPLETE: CPT | Performed by: FAMILY MEDICINE

## 2024-05-30 PROCEDURE — 3074F SYST BP LT 130 MM HG: CPT | Performed by: FAMILY MEDICINE

## 2024-05-30 PROCEDURE — 3078F DIAST BP <80 MM HG: CPT | Performed by: FAMILY MEDICINE

## 2024-05-30 PROCEDURE — 1036F TOBACCO NON-USER: CPT | Performed by: FAMILY MEDICINE

## 2024-05-30 PROCEDURE — 81002 URINALYSIS NONAUTO W/O SCOPE: CPT | Performed by: FAMILY MEDICINE

## 2024-05-30 RX ORDER — FLUOXETINE HYDROCHLORIDE 40 MG/1
40 CAPSULE ORAL DAILY
Qty: 90 CAPSULE | Refills: 1 | Status: SHIPPED | OUTPATIENT
Start: 2024-05-30 | End: 2024-06-11 | Stop reason: SDUPTHER

## 2024-05-30 RX ORDER — BLOOD-GLUCOSE SENSOR
EACH MISCELLANEOUS
Qty: 3 EACH | Refills: 11 | Status: SHIPPED | OUTPATIENT
Start: 2024-05-30

## 2024-05-30 RX ORDER — LOSARTAN POTASSIUM AND HYDROCHLOROTHIAZIDE 25; 100 MG/1; MG/1
1 TABLET ORAL
Qty: 90 TABLET | Refills: 1 | Status: SHIPPED | OUTPATIENT
Start: 2024-05-30 | End: 2024-06-04 | Stop reason: SDUPTHER

## 2024-05-30 RX ORDER — PRAVASTATIN SODIUM 40 MG/1
40 TABLET ORAL DAILY
Qty: 90 TABLET | Refills: 1 | Status: SHIPPED | OUTPATIENT
Start: 2024-05-30 | End: 2024-06-11 | Stop reason: SDUPTHER

## 2024-05-30 ASSESSMENT — PATIENT HEALTH QUESTIONNAIRE - PHQ9
1. LITTLE INTEREST OR PLEASURE IN DOING THINGS: NOT AT ALL
2. FEELING DOWN, DEPRESSED OR HOPELESS: NOT AT ALL
SUM OF ALL RESPONSES TO PHQ9 QUESTIONS 1 AND 2: 0

## 2024-05-30 ASSESSMENT — PROMIS GLOBAL HEALTH SCALE
RATE_MENTAL_HEALTH: GOOD
RATE_GENERAL_HEALTH: GOOD
RATE_PHYSICAL_HEALTH: GOOD
CARRYOUT_SOCIAL_ACTIVITIES: GOOD
RATE_SOCIAL_SATISFACTION: GOOD
RATE_AVERAGE_FATIGUE: MILD
EMOTIONAL_PROBLEMS: RARELY
RATE_QUALITY_OF_LIFE: GOOD
RATE_AVERAGE_PAIN: 3
CARRYOUT_PHYSICAL_ACTIVITIES: MOSTLY

## 2024-05-30 NOTE — PROGRESS NOTES
Subjective   Patient ID: Haleigh Pretty is a 63 y.o. female who presents for Annual Exam.    Past Medical, Surgical, and Family History reviewed and updated in chart.    Reviewed all medications by prescribing practitioner or clinical pharmacist (such as prescriptions, OTCs, herbal therapies and supplements) and documented in the medical record.    HPI  1.  Physical exam.  Pap: Cannot recall when it was last done, but knows she is overdue and will make an appointment with her gynecologist  Mammogram September 2023, up-to-date  Colonoscopy due 2025  Immunizations: due for shingles, not interested today  Work done in anticipation of today's visit noted below     2.  Diabetes.  Currently taking metformin 250 mg twice daily along with Ozempic 0.5 mg weekly  Recent hemoglobin A1c was excellent at 6%    3.  Hyperlipidemia.  Currently taking pravastatin, although she does have some joint pain from time to time  Not necessarily interested in discontinuation of medication currently  Recent lipid panel excellent    4.  Hypertension.  No reported chest pain, shortness of breath or any other cardiac/respiratory concerns  BP normotensive in intake    5.  Anxiety and depression.  Fluoxetine 40 mg daily, she is so happy that she is on this medication, telling me that since discontinuing Lexapro, it has made a significant difference in her overall emotional wellbeing    Review of Systems  All pertinent positive symptoms are included in the history of present illness.    All other systems have been reviewed and are negative and noncontributory to this patient's current ailments.    Past Medical History:   Diagnosis Date    Abnormal findings on diagnostic imaging of other specified body structures     Abnormal radiographic examination    Influenza due to other identified influenza virus with other respiratory manifestations 01/05/2018    Influenza A    Laceration without foreign body of right hand, initial encounter 11/15/2017     Laceration of right hand without foreign body, initial encounter    Myositis, unspecified 07/02/2015    Myofascitis    Pain in thoracic spine 07/02/2015    Pain in thoracic spine    Personal history of other diseases of the digestive system 07/27/2018    History of fatty infiltration of liver    Personal history of other endocrine, nutritional and metabolic disease     History of hyperlipidemia    Personal history of other specified conditions 08/25/2017    History of palpitations    Sprain of unspecified parts of thorax, initial encounter 07/02/2015    Thoracic sprain    Strain of muscle, fascia and tendon at neck level, initial encounter 11/12/2019    Cervical strain     Past Surgical History:   Procedure Laterality Date    OTHER SURGICAL HISTORY  10/15/2014    Mastectomy For Gynecomastia Bilateral    TONSILLECTOMY  10/15/2014    Tonsillectomy     Social History     Tobacco Use    Smoking status: Never    Smokeless tobacco: Never     Family History   Problem Relation Name Age of Onset    JAKE disease Mother      Lymphoma Father       Immunization History   Administered Date(s) Administered    Flu vaccine (IIV4), preservative free *Check age/dose* 10/30/2023    Flu vaccine, quadrivalent, no egg protein, age 6 month or greater (FLUCELVAX) 10/18/2018, 09/20/2019, 10/13/2020    Influenza, seasonal, injectable 10/30/1995, 10/20/1997, 10/24/2022    ZocDoc SARS-CoV-2 Vaccination 03/17/2021    Moderna SARS-CoV-2 Vaccination 10/24/2022    Pfizer Purple Cap SARS-CoV-2 11/27/2021    Tdap vaccine, age 7 year and older (BOOSTRIX, ADACEL) 01/24/2017, 08/03/2022     Current Outpatient Medications   Medication Instructions    Dexcom G7 Sensor device Use as directed, applying 1 sensor every 10 days    FLUoxetine (PROZAC) 40 mg, oral, Daily    losartan-hydrochlorothiazide (Hyzaar) 100-25 mg tablet 1 tablet, oral, Every Day    pravastatin (PRAVACHOL) 40 mg, oral, Daily    [START ON 6/2/2024] semaglutide 0.5 mg, subcutaneous, Once  "Weekly     Allergies   Allergen Reactions    Propoxyphene N-Acetaminophen Unknown       Objective   Vitals:    05/30/24 1416   BP: 120/76   Pulse: 77   Weight: 63.5 kg (140 lb)   Height: 1.626 m (5' 4\")     Body mass index is 24.03 kg/m².    BP Readings from Last 3 Encounters:   05/30/24 120/76   03/21/23 132/80   08/03/22 124/70      Wt Readings from Last 3 Encounters:   05/30/24 63.5 kg (140 lb)   03/21/23 66.2 kg (146 lb)   08/03/22 67.6 kg (149 lb)        Office Visit on 05/30/2024   Component Date Value    POC Color, Urine 05/30/2024 Yellow     POC Appearance, Urine 05/30/2024 Clear     POC Glucose, Urine 05/30/2024 NEGATIVE     POC Bilirubin, Urine 05/30/2024 NEGATIVE     POC Ketones, Urine 05/30/2024 NEGATIVE     POC Specific Gravity, Ur* 05/30/2024 1.015     POC Blood, Urine 05/30/2024 NEGATIVE     POC PH, Urine 05/30/2024 6.0     POC Protein, Urine 05/30/2024 NEGATIVE     POC Urobilinogen, Urine 05/30/2024 0.2     Poc Nitrite, Urine 05/30/2024 NEGATIVE     POC Leukocytes, Urine 05/30/2024 NEGATIVE    Lab on 05/29/2024   Component Date Value    Glucose 05/29/2024 88     Sodium 05/29/2024 138     Potassium 05/29/2024 3.7     Chloride 05/29/2024 103     Bicarbonate 05/29/2024 30     Anion Gap 05/29/2024 9 (L)     Urea Nitrogen 05/29/2024 18     Creatinine 05/29/2024 0.70     eGFR 05/29/2024 >90     Calcium 05/29/2024 9.1     Albumin 05/29/2024 4.2     Alkaline Phosphatase 05/29/2024 33     Total Protein 05/29/2024 6.5     AST 05/29/2024 16     Bilirubin, Total 05/29/2024 0.6     ALT 05/29/2024 17     Cholesterol 05/29/2024 156     HDL-Cholesterol 05/29/2024 44.6     Cholesterol/HDL Ratio 05/29/2024 3.5     LDL Calculated 05/29/2024 91     VLDL 05/29/2024 20     Triglycerides 05/29/2024 100     Non HDL Cholesterol 05/29/2024 111     Hemoglobin A1C 05/29/2024 6.0 (H)     Estimated Average Glucose 05/29/2024 126      Physical Exam  CONSTITUTIONAL - well nourished, well developed, looks like stated age, in no " acute distress, not ill-appearing, and not tired appearing  SKIN - normal skin color and pigmentation, normal skin turgor without rash, lesions, or nodules visualized  HEAD - no trauma, normocephalic  EYES - pupils are equal and reactive to light, extraocular muscles are intact, and normal external exam  ENT - TM's intact, no injection, no signs of infection, uvula midline, normal tongue movement and throat normal, no exudate, nasal passage without discharge and patent  NECK - supple without rigidity, no neck mass was observed, no thyromegaly or thyroid nodules  CHEST - clear to auscultation, no wheezing, no crackles and no rales, good effort  CARDIAC - regular rate and regular rhythm, no skipped beats, no murmur  ABDOMEN - no organomegaly, soft, nontender, nondistended, normal bowel sounds, no guarding/rebound/rigidity, negative McBurney sign and negative Torres sign  EXTREMITIES - no obvious or evident edema, no obvious or evident deformities  NEUROLOGICAL - normal gait, normal balance, normal motor, no ataxia, alert, oriented and no focal signs  PSYCHIATRIC - alert, pleasant and cordial, age-appropriate  IMMUNOLOGIC - no cervical lymphadenopathy    Assessment/Plan   Problem List Items Addressed This Visit       Hyperlipemia     Stable, no changes to medication recommended  Please repeat blood work 6 months         Relevant Medications    pravastatin (Pravachol) 40 mg tablet    Hypertension     Stable, no changes to medication recommended  I would like to have you monitor and record blood pressures at home   Blood pressure goal should be below 130/80, ideally 120/80  If the blood pressure is too high or too low, we need to consider making adjustments to your antihypertensive therapy         Relevant Medications    losartan-hydrochlorothiazide (Hyzaar) 100-25 mg tablet    New onset type 2 diabetes mellitus (Multi)     Congratulations on your excellent hemoglobin A1c  We will have you continue Ozempic 0.5 mg weekly,  discontinue metformin  Dexcom 7 sensors sent to your pharmacy so that you can have a better idea of how your blood sugar fluctuates with your food intake         Relevant Medications    Dexcom G7 Sensor device    losartan-hydrochlorothiazide (Hyzaar) 100-25 mg tablet    pravastatin (Pravachol) 40 mg tablet    semaglutide 0.25 mg or 0.5 mg (2 mg/3 mL) pen injector (Start on 6/2/2024)    Anxiety and depression     Stable, no changes to medication recommended         Relevant Medications    FLUoxetine (PROzac) 40 mg capsule    Physical exam, annual - Primary     Complete history and physical examination was performed  EKG reveals sinus rhythm without acute changes  We will notify of test results once available         Relevant Orders    ECG 12 lead (Clinic Performed) (Completed)    POCT UA (nonautomated) manually resulted (Completed)     Other Visit Diagnoses       Need for shingles vaccine        Highly recommended, but declined  If you change your mind, please let us know or obtain from your local pharmacy

## 2024-05-30 NOTE — ASSESSMENT & PLAN NOTE
Congratulations on your excellent hemoglobin A1c  We will have you continue Ozempic 0.5 mg weekly, discontinue metformin  Dexcom 7 sensors sent to your pharmacy so that you can have a better idea of how your blood sugar fluctuates with your food intake

## 2024-05-30 NOTE — RESULT ENCOUNTER NOTE
We will review results in the office later today:  Hemoglobin A1c is much better at 6% previously 6.4, 6.3

## 2024-05-30 NOTE — RESULT ENCOUNTER NOTE
We will review in the office on May 30:  At the time of review hemoglobin A1c not yet available  Electrolytes, kidney, liver normal  Cholesterol 156, 44, 91, 100 previous 168, 42, 100, 130

## 2024-06-04 DIAGNOSIS — E11.9 NEW ONSET TYPE 2 DIABETES MELLITUS (MULTI): ICD-10-CM

## 2024-06-04 DIAGNOSIS — I10 PRIMARY HYPERTENSION: ICD-10-CM

## 2024-06-04 RX ORDER — LOSARTAN POTASSIUM AND HYDROCHLOROTHIAZIDE 25; 100 MG/1; MG/1
1 TABLET ORAL
Qty: 30 TABLET | Refills: 0 | Status: SHIPPED | OUTPATIENT
Start: 2024-06-04 | End: 2024-06-11 | Stop reason: SDUPTHER

## 2024-06-05 DIAGNOSIS — E11.9 NEW ONSET TYPE 2 DIABETES MELLITUS (MULTI): ICD-10-CM

## 2024-06-05 DIAGNOSIS — I10 PRIMARY HYPERTENSION: ICD-10-CM

## 2024-06-07 RX ORDER — LOSARTAN POTASSIUM AND HYDROCHLOROTHIAZIDE 25; 100 MG/1; MG/1
1 TABLET ORAL DAILY
Qty: 90 TABLET | Refills: 1 | OUTPATIENT
Start: 2024-06-07

## 2024-06-11 DIAGNOSIS — I10 PRIMARY HYPERTENSION: ICD-10-CM

## 2024-06-11 DIAGNOSIS — E78.2 MIXED HYPERLIPIDEMIA: ICD-10-CM

## 2024-06-11 DIAGNOSIS — F41.9 ANXIETY AND DEPRESSION: ICD-10-CM

## 2024-06-11 DIAGNOSIS — F32.A ANXIETY AND DEPRESSION: ICD-10-CM

## 2024-06-11 DIAGNOSIS — E11.9 NEW ONSET TYPE 2 DIABETES MELLITUS (MULTI): ICD-10-CM

## 2024-06-11 RX ORDER — LOSARTAN POTASSIUM AND HYDROCHLOROTHIAZIDE 25; 100 MG/1; MG/1
1 TABLET ORAL
Qty: 90 TABLET | Refills: 0 | Status: SHIPPED | OUTPATIENT
Start: 2024-06-11 | End: 2024-09-09

## 2024-06-11 RX ORDER — PRAVASTATIN SODIUM 40 MG/1
40 TABLET ORAL DAILY
Qty: 90 TABLET | Refills: 0 | Status: SHIPPED | OUTPATIENT
Start: 2024-06-11 | End: 2024-09-09

## 2024-06-11 RX ORDER — FLUOXETINE HYDROCHLORIDE 40 MG/1
40 CAPSULE ORAL DAILY
Qty: 90 CAPSULE | Refills: 0 | Status: SHIPPED | OUTPATIENT
Start: 2024-06-11 | End: 2024-09-09

## 2024-08-26 DIAGNOSIS — E11.9 NEW ONSET TYPE 2 DIABETES MELLITUS (MULTI): ICD-10-CM

## 2024-08-27 DIAGNOSIS — E11.9 NEW ONSET TYPE 2 DIABETES MELLITUS (MULTI): ICD-10-CM

## 2024-09-12 DIAGNOSIS — F41.9 ANXIETY AND DEPRESSION: ICD-10-CM

## 2024-09-12 DIAGNOSIS — I10 PRIMARY HYPERTENSION: ICD-10-CM

## 2024-09-12 DIAGNOSIS — E78.2 MIXED HYPERLIPIDEMIA: ICD-10-CM

## 2024-09-12 DIAGNOSIS — F32.A ANXIETY AND DEPRESSION: ICD-10-CM

## 2024-09-12 DIAGNOSIS — E11.9 NEW ONSET TYPE 2 DIABETES MELLITUS (MULTI): ICD-10-CM

## 2024-09-12 RX ORDER — PRAVASTATIN SODIUM 40 MG/1
40 TABLET ORAL DAILY
Qty: 90 TABLET | Refills: 0 | Status: SHIPPED | OUTPATIENT
Start: 2024-09-12 | End: 2024-12-11

## 2024-09-12 RX ORDER — LOSARTAN POTASSIUM AND HYDROCHLOROTHIAZIDE 25; 100 MG/1; MG/1
1 TABLET ORAL
Qty: 90 TABLET | Refills: 0 | Status: SHIPPED | OUTPATIENT
Start: 2024-09-12 | End: 2024-12-11

## 2024-09-12 RX ORDER — FLUOXETINE HYDROCHLORIDE 40 MG/1
40 CAPSULE ORAL DAILY
Qty: 90 CAPSULE | Refills: 0 | Status: SHIPPED | OUTPATIENT
Start: 2024-09-12 | End: 2024-12-11

## 2024-10-14 ENCOUNTER — TELEMEDICINE (OUTPATIENT)
Dept: PRIMARY CARE | Facility: CLINIC | Age: 64
End: 2024-10-14
Payer: COMMERCIAL

## 2024-10-14 DIAGNOSIS — R05.1 ACUTE COUGH: ICD-10-CM

## 2024-10-14 DIAGNOSIS — J06.9 ACUTE URI: Primary | ICD-10-CM

## 2024-10-14 DIAGNOSIS — R52 BODY ACHES: ICD-10-CM

## 2024-10-14 DIAGNOSIS — R68.83 CHILLS: ICD-10-CM

## 2024-10-14 PROCEDURE — 99214 OFFICE O/P EST MOD 30 MIN: CPT | Performed by: STUDENT IN AN ORGANIZED HEALTH CARE EDUCATION/TRAINING PROGRAM

## 2024-10-14 PROCEDURE — 3048F LDL-C <100 MG/DL: CPT | Performed by: STUDENT IN AN ORGANIZED HEALTH CARE EDUCATION/TRAINING PROGRAM

## 2024-10-14 PROCEDURE — 1036F TOBACCO NON-USER: CPT | Performed by: STUDENT IN AN ORGANIZED HEALTH CARE EDUCATION/TRAINING PROGRAM

## 2024-10-14 PROCEDURE — 3044F HG A1C LEVEL LT 7.0%: CPT | Performed by: STUDENT IN AN ORGANIZED HEALTH CARE EDUCATION/TRAINING PROGRAM

## 2024-10-14 RX ORDER — AMOXICILLIN AND CLAVULANATE POTASSIUM 875; 125 MG/1; MG/1
875 TABLET, FILM COATED ORAL 2 TIMES DAILY
Qty: 20 TABLET | Refills: 0 | Status: SHIPPED | OUTPATIENT
Start: 2024-10-14 | End: 2024-10-24

## 2024-10-14 NOTE — PROGRESS NOTES
"Subjective   Patient ID: Haleigh Pretty is a 64 y.o. female who presents for Cough and Fever.    HPI   Patient is calling into the office today via a telemedicine virtual visit to discuss signs/symptoms of an upper respiratory infection    Ultimately she did travel to [place] follow-up and visit her grandchildren approximately 16 days ago    Ever since she noted that she started with a sore throat that ultimately turned into a \"cold \"with sinus congestion, postnasal drip, slight cough, and having a slight upper respiratory infection/symptoms    Ultimately over this past weekend she noted some slight worsening symptoms including chills and bodyaches and tested negative x 2 for COVID-19    Denies any difficulty breathing or any other acute signs/symptoms    Asking to be further evaluated/treated    Review of Systems  All pertinent positive symptoms are included in the history of present illness.    All other systems have been reviewed and are negative and noncontributory to this patient's current ailments.    Objective   There were no vitals taken for this visit.    Physical Exam  CONSTITUTIONAL - well nourished, well developed, looks like stated age, in no acute distress, appears slightly fatigued and slightly tired and ill sitting on her bed  SKIN - normal skin color and pigmentation, normal skin turgor without rash, lesions, or nodules visualized  HEAD - no trauma, normocephalic  EYES - normal external exam  CHEST -no distressed breathing, good effort  EXTREMITIES - no edema, no deformities  NEUROLOGICAL - normal balance, normal motor, no ataxia  PSYCHIATRIC - alert, pleasant and cordial, age-appropriate    Assessment/Plan   We had a long conversation about all of your signs/symptoms and I did opt to treat you with Augmentin 875 mg twice daily for the next 10 days    Due to your symptoms worsening over the past week and I did recommend that we consider a chest x-ray within the next 1-3 days pending on how you are " feeling    We also discussed the risks and benefits and I did recommend that you tested for RSV, influenza, and even COVID-19 possibly once again but at this time you declined wishing to do the antibiotic therapy first    I did agree to this but if you notice your symptoms are not getting better within the next 2-3 days we need to consider further testing/evaluation    At any point if you notice worsening acute signs/symptoms such as difficulty breathing, chest pain, or any other acute signs/symptoms, I would recommend that you contact me immediately and/or go to the nearest emergency room to be acutely evaluated

## 2024-12-01 DIAGNOSIS — F41.9 ANXIETY AND DEPRESSION: ICD-10-CM

## 2024-12-01 DIAGNOSIS — E11.9 NEW ONSET TYPE 2 DIABETES MELLITUS (MULTI): ICD-10-CM

## 2024-12-01 DIAGNOSIS — E78.2 MIXED HYPERLIPIDEMIA: ICD-10-CM

## 2024-12-01 DIAGNOSIS — F32.A ANXIETY AND DEPRESSION: ICD-10-CM

## 2024-12-01 DIAGNOSIS — I10 PRIMARY HYPERTENSION: ICD-10-CM

## 2024-12-02 RX ORDER — LOSARTAN POTASSIUM AND HYDROCHLOROTHIAZIDE 25; 100 MG/1; MG/1
1 TABLET ORAL DAILY
Qty: 30 TABLET | Refills: 0 | Status: SHIPPED | OUTPATIENT
Start: 2024-12-02

## 2024-12-02 RX ORDER — SEMAGLUTIDE 0.68 MG/ML
0.5 INJECTION, SOLUTION SUBCUTANEOUS WEEKLY
Qty: 3 ML | Refills: 0 | Status: SHIPPED | OUTPATIENT
Start: 2024-12-02 | End: 2024-12-24

## 2024-12-02 RX ORDER — FLUOXETINE HYDROCHLORIDE 40 MG/1
40 CAPSULE ORAL DAILY
Qty: 30 CAPSULE | Refills: 0 | Status: SHIPPED | OUTPATIENT
Start: 2024-12-02

## 2024-12-02 RX ORDER — PRAVASTATIN SODIUM 40 MG/1
40 TABLET ORAL DAILY
Qty: 30 TABLET | Refills: 0 | Status: SHIPPED | OUTPATIENT
Start: 2024-12-02

## 2024-12-17 ENCOUNTER — APPOINTMENT (OUTPATIENT)
Dept: PRIMARY CARE | Facility: CLINIC | Age: 64
End: 2024-12-17
Payer: COMMERCIAL

## 2024-12-17 DIAGNOSIS — F32.A ANXIETY AND DEPRESSION: ICD-10-CM

## 2024-12-17 DIAGNOSIS — I10 PRIMARY HYPERTENSION: ICD-10-CM

## 2024-12-17 DIAGNOSIS — E78.2 MIXED HYPERLIPIDEMIA: ICD-10-CM

## 2024-12-17 DIAGNOSIS — F41.9 ANXIETY AND DEPRESSION: ICD-10-CM

## 2024-12-17 DIAGNOSIS — E11.9 NEW ONSET TYPE 2 DIABETES MELLITUS (MULTI): Primary | ICD-10-CM

## 2024-12-17 PROCEDURE — 3044F HG A1C LEVEL LT 7.0%: CPT | Performed by: FAMILY MEDICINE

## 2024-12-17 PROCEDURE — 99214 OFFICE O/P EST MOD 30 MIN: CPT | Performed by: FAMILY MEDICINE

## 2024-12-17 PROCEDURE — 1036F TOBACCO NON-USER: CPT | Performed by: FAMILY MEDICINE

## 2024-12-17 PROCEDURE — 3048F LDL-C <100 MG/DL: CPT | Performed by: FAMILY MEDICINE

## 2024-12-17 RX ORDER — LOSARTAN POTASSIUM AND HYDROCHLOROTHIAZIDE 25; 100 MG/1; MG/1
1 TABLET ORAL DAILY
Qty: 30 TABLET | Refills: 0 | Status: SHIPPED | OUTPATIENT
Start: 2024-12-17

## 2024-12-17 RX ORDER — FLUOXETINE HYDROCHLORIDE 40 MG/1
40 CAPSULE ORAL DAILY
Qty: 30 CAPSULE | Refills: 0 | Status: SHIPPED | OUTPATIENT
Start: 2024-12-17

## 2024-12-17 RX ORDER — SEMAGLUTIDE 0.68 MG/ML
0.5 INJECTION, SOLUTION SUBCUTANEOUS WEEKLY
Qty: 3 ML | Refills: 0 | Status: SHIPPED | OUTPATIENT
Start: 2024-12-17 | End: 2025-01-08

## 2024-12-17 RX ORDER — PRAVASTATIN SODIUM 40 MG/1
40 TABLET ORAL DAILY
Qty: 30 TABLET | Refills: 0 | Status: SHIPPED | OUTPATIENT
Start: 2024-12-17

## 2024-12-17 NOTE — ASSESSMENT & PLAN NOTE
Stable, no changes to medication recommended  Please repeat blood work   Refill sent for 1 month until lab work returns then will refill until wellness visit in May 2025

## 2024-12-17 NOTE — PROGRESS NOTES
Subjective   Patient ID: Haleigh Pretty is a 64 y.o. female who presents virtually for Anxiety, Depression, Hyperlipidemia, and Diabetes.    Past Medical, Surgical, and Family History reviewed and updated in chart.    Reviewed all medications by prescribing practitioner or clinical pharmacist (such as prescriptions, OTCs, herbal therapies and supplements) and documented in the medical record.    HPI  1. Anxiety and Depression    Haleigh reports stability on her current dose of Prozac, which was increased last year. She feels it is effectively managing her previous symptoms. She requires refills.    2. Hyperlipidemia (HLD)    Haleigh needs to complete lab work. She feels stable and denies experiencing any side effects.    3. Type 2 Diabetes Mellitus (T2DM)    Lab work is needed. She is taking Ozempic 0.5 mg without any side effects and requires refills.    4. Hypertension (HTN)    She is currently on losartan-hydrochlorothiazide. She denies any symptoms of acute coronary syndrome (ACS). She needs refills today and lab work completed.     Review of Systems  All pertinent positive symptoms are included in the history of present illness.    All other systems have been reviewed and are negative and noncontributory to this patient's current ailments.    Past Medical History:   Diagnosis Date    Abnormal findings on diagnostic imaging of other specified body structures     Abnormal radiographic examination    Influenza due to other identified influenza virus with other respiratory manifestations 01/05/2018    Influenza A    Laceration without foreign body of right hand, initial encounter 11/15/2017    Laceration of right hand without foreign body, initial encounter    Myositis, unspecified 07/02/2015    Myofascitis    Pain in thoracic spine 07/02/2015    Pain in thoracic spine    Personal history of other diseases of the digestive system 07/27/2018    History of fatty infiltration of liver    Personal history of other endocrine,  nutritional and metabolic disease     History of hyperlipidemia    Personal history of other specified conditions 08/25/2017    History of palpitations    Sprain of unspecified parts of thorax, initial encounter 07/02/2015    Thoracic sprain    Strain of muscle, fascia and tendon at neck level, initial encounter 11/12/2019    Cervical strain     Past Surgical History:   Procedure Laterality Date    OTHER SURGICAL HISTORY  10/15/2014    Mastectomy For Gynecomastia Bilateral    TONSILLECTOMY  10/15/2014    Tonsillectomy     Social History     Tobacco Use    Smoking status: Never    Smokeless tobacco: Never     Family History   Problem Relation Name Age of Onset    JAKE disease Mother      Lymphoma Father       Immunization History   Administered Date(s) Administered    Flu vaccine (IIV4), preservative free *Check age/dose* 10/21/2021, 10/30/2023    Flu vaccine, quadrivalent, no egg protein, age 6 month or greater (FLUCELVAX) 10/18/2018, 09/20/2019, 10/13/2020    Influenza, seasonal, injectable 10/30/1995, 10/20/1997, 10/24/2022    [employer] SARS-CoV-2 Vaccination 03/17/2021    Moderna SARS-CoV-2 Vaccination 10/24/2022    Pfizer Purple Cap SARS-CoV-2 11/27/2021    Tdap vaccine, age 7 year and older (BOOSTRIX, ADACEL) 01/24/2017, 08/03/2022     Current Outpatient Medications   Medication Instructions    Dexcom G7 Sensor device Use as directed, applying 1 sensor every 10 days    FLUoxetine (PROZAC) 40 mg, oral, Daily    losartan-hydrochlorothiazide (Hyzaar) 100-25 mg tablet 1 tablet, oral, Daily    Ozempic 0.5 mg, abdominal subcutaneous, Weekly    pravastatin (PRAVACHOL) 40 mg, oral, Daily     Allergies   Allergen Reactions    Propoxyphene N-Acetaminophen Unknown       Objective   There were no vitals filed for this visit.  There is no height or weight on file to calculate BMI.    BP Readings from Last 3 Encounters:   05/30/24 120/76   03/21/23 132/80   08/03/22 124/70      Wt Readings from Last 3 Encounters:   05/30/24  63.5 kg (140 lb)   03/21/23 66.2 kg (146 lb)   08/03/22 67.6 kg (149 lb)        No visits with results within 1 Month(s) from this visit.   Latest known visit with results is:   Office Visit on 05/30/2024   Component Date Value    POC Color, Urine 05/30/2024 Yellow     POC Appearance, Urine 05/30/2024 Clear     POC Glucose, Urine 05/30/2024 NEGATIVE     POC Bilirubin, Urine 05/30/2024 NEGATIVE     POC Ketones, Urine 05/30/2024 NEGATIVE     POC Specific Gravity, Ur* 05/30/2024 1.015     POC Blood, Urine 05/30/2024 NEGATIVE     POC PH, Urine 05/30/2024 6.0     POC Protein, Urine 05/30/2024 NEGATIVE     POC Urobilinogen, Urine 05/30/2024 0.2     Poc Nitrite, Urine 05/30/2024 NEGATIVE     POC Leukocytes, Urine 05/30/2024 NEGATIVE      Physical Exam  CONSTITUTIONAL - well nourished, well developed, looks like stated age, in no acute distress, not ill-appearing, and not tired appearing  SKIN - normal skin color and pigmentation, normal skin turgor without rash, lesions, or nodules visualized  HEAD - no trauma, normocephalic  EYES - pupils are equal and reactive to light, extraocular muscles are intact, and normal external exam  CHEST -  good effort  NEUROLOGICAL - alert, oriented and no focal signs  PSYCHIATRIC - alert, pleasant and cordial, age-appropriate      Assessment/Plan   Problem List Items Addressed This Visit       Hyperlipemia     Stable, no changes to medication recommended  Please repeat blood work   Refill sent for 1 month until lab work returns then will refill until wellness visit in May 2025         Relevant Medications    pravastatin (Pravachol) 40 mg tablet    Other Relevant Orders    Lipid Panel    Hypertension     Stable, no changes to medication recommended  Please repeat blood work   Refill sent for 1 month until lab work returns then will refill until wellness visit in May 2025         Relevant Medications    losartan-hydrochlorothiazide (Hyzaar) 100-25 mg tablet    Other Relevant Orders     Comprehensive Metabolic Panel    CBC    New onset type 2 diabetes mellitus (Multi) - Primary     Stable, no changes to medication recommended  Please repeat blood work   Refill sent for 1 month until lab work returns then will refill until wellness visit in May 2025         Relevant Medications    pravastatin (Pravachol) 40 mg tablet    losartan-hydrochlorothiazide (Hyzaar) 100-25 mg tablet    semaglutide (Ozempic) 0.25 mg or 0.5 mg (2 mg/3 mL) pen injector    Other Relevant Orders    Hemoglobin A1C    Anxiety and depression     Stable, no changes to medication recommended  Please repeat blood work   Refill sent for 1 month until lab work returns then will refill until wellness visit in May 2025         Relevant Medications    FLUoxetine (PROzac) 40 mg capsule

## 2025-01-15 ENCOUNTER — LAB (OUTPATIENT)
Dept: LAB | Facility: LAB | Age: 65
End: 2025-01-15
Payer: COMMERCIAL

## 2025-01-15 DIAGNOSIS — E78.2 MIXED HYPERLIPIDEMIA: ICD-10-CM

## 2025-01-15 DIAGNOSIS — I10 PRIMARY HYPERTENSION: ICD-10-CM

## 2025-01-15 DIAGNOSIS — E11.9 NEW ONSET TYPE 2 DIABETES MELLITUS (MULTI): ICD-10-CM

## 2025-01-15 LAB
ALBUMIN SERPL BCP-MCNC: 4.5 G/DL (ref 3.4–5)
ALP SERPL-CCNC: 34 U/L (ref 33–136)
ALT SERPL W P-5'-P-CCNC: 20 U/L (ref 7–45)
ANION GAP SERPL CALC-SCNC: 10 MMOL/L (ref 10–20)
AST SERPL W P-5'-P-CCNC: 20 U/L (ref 9–39)
BILIRUB SERPL-MCNC: 0.8 MG/DL (ref 0–1.2)
BUN SERPL-MCNC: 21 MG/DL (ref 6–23)
CALCIUM SERPL-MCNC: 9.5 MG/DL (ref 8.6–10.3)
CHLORIDE SERPL-SCNC: 100 MMOL/L (ref 98–107)
CHOLEST SERPL-MCNC: 144 MG/DL (ref 0–199)
CHOLESTEROL/HDL RATIO: 3.3
CO2 SERPL-SCNC: 30 MMOL/L (ref 21–32)
CREAT SERPL-MCNC: 0.69 MG/DL (ref 0.5–1.05)
EGFRCR SERPLBLD CKD-EPI 2021: >90 ML/MIN/1.73M*2
ERYTHROCYTE [DISTWIDTH] IN BLOOD BY AUTOMATED COUNT: 12.2 % (ref 11.5–14.5)
EST. AVERAGE GLUCOSE BLD GHB EST-MCNC: 123 MG/DL
GLUCOSE SERPL-MCNC: 87 MG/DL (ref 74–99)
HBA1C MFR BLD: 5.9 %
HCT VFR BLD AUTO: 41.4 % (ref 36–46)
HDLC SERPL-MCNC: 43.6 MG/DL
HGB BLD-MCNC: 13.8 G/DL (ref 12–16)
LDLC SERPL CALC-MCNC: 82 MG/DL
MCH RBC QN AUTO: 28.5 PG (ref 26–34)
MCHC RBC AUTO-ENTMCNC: 33.3 G/DL (ref 32–36)
MCV RBC AUTO: 86 FL (ref 80–100)
NON HDL CHOLESTEROL: 100 MG/DL (ref 0–149)
NRBC BLD-RTO: 0 /100 WBCS (ref 0–0)
PLATELET # BLD AUTO: 229 X10*3/UL (ref 150–450)
POTASSIUM SERPL-SCNC: 3.9 MMOL/L (ref 3.5–5.3)
PROT SERPL-MCNC: 6.7 G/DL (ref 6.4–8.2)
RBC # BLD AUTO: 4.84 X10*6/UL (ref 4–5.2)
SODIUM SERPL-SCNC: 136 MMOL/L (ref 136–145)
TRIGL SERPL-MCNC: 93 MG/DL (ref 0–149)
VLDL: 19 MG/DL (ref 0–40)
WBC # BLD AUTO: 5.4 X10*3/UL (ref 4.4–11.3)

## 2025-01-15 PROCEDURE — 80061 LIPID PANEL: CPT

## 2025-01-15 PROCEDURE — 83036 HEMOGLOBIN GLYCOSYLATED A1C: CPT

## 2025-01-15 PROCEDURE — 85027 COMPLETE CBC AUTOMATED: CPT

## 2025-01-15 PROCEDURE — 80053 COMPREHEN METABOLIC PANEL: CPT

## 2025-01-16 NOTE — RESULT ENCOUNTER NOTE
We will review in the office on January 17:  Hemoglobin A1c 5.9 previous 6.0  Cholesterol 144 with LDL of 82  Electrolytes, kidney, liver, complete blood cell count normal  Very impressive across-the-board  I do not anticipate any changes to medication unless you do not tolerate something well

## 2025-01-17 ENCOUNTER — OFFICE VISIT (OUTPATIENT)
Dept: PRIMARY CARE | Facility: CLINIC | Age: 65
End: 2025-01-17
Payer: COMMERCIAL

## 2025-01-17 VITALS
HEIGHT: 64 IN | SYSTOLIC BLOOD PRESSURE: 116 MMHG | DIASTOLIC BLOOD PRESSURE: 78 MMHG | HEART RATE: 67 BPM | BODY MASS INDEX: 24.59 KG/M2 | WEIGHT: 144 LBS

## 2025-01-17 DIAGNOSIS — Z12.31 SCREENING MAMMOGRAM FOR BREAST CANCER: ICD-10-CM

## 2025-01-17 DIAGNOSIS — F32.A ANXIETY AND DEPRESSION: ICD-10-CM

## 2025-01-17 DIAGNOSIS — L65.9 HAIR THINNING: ICD-10-CM

## 2025-01-17 DIAGNOSIS — L60.3 THIN NAILS: ICD-10-CM

## 2025-01-17 DIAGNOSIS — I10 PRIMARY HYPERTENSION: ICD-10-CM

## 2025-01-17 DIAGNOSIS — F41.9 ANXIETY AND DEPRESSION: ICD-10-CM

## 2025-01-17 DIAGNOSIS — E78.2 MIXED HYPERLIPIDEMIA: ICD-10-CM

## 2025-01-17 DIAGNOSIS — E11.9 NEW ONSET TYPE 2 DIABETES MELLITUS (MULTI): Primary | ICD-10-CM

## 2025-01-17 PROCEDURE — 3008F BODY MASS INDEX DOCD: CPT | Performed by: FAMILY MEDICINE

## 2025-01-17 PROCEDURE — 99214 OFFICE O/P EST MOD 30 MIN: CPT | Performed by: FAMILY MEDICINE

## 2025-01-17 PROCEDURE — 1036F TOBACCO NON-USER: CPT | Performed by: FAMILY MEDICINE

## 2025-01-17 PROCEDURE — 3044F HG A1C LEVEL LT 7.0%: CPT | Performed by: FAMILY MEDICINE

## 2025-01-17 PROCEDURE — 3048F LDL-C <100 MG/DL: CPT | Performed by: FAMILY MEDICINE

## 2025-01-17 PROCEDURE — 3074F SYST BP LT 130 MM HG: CPT | Performed by: FAMILY MEDICINE

## 2025-01-17 PROCEDURE — 3078F DIAST BP <80 MM HG: CPT | Performed by: FAMILY MEDICINE

## 2025-01-17 RX ORDER — CHOLECALCIFEROL (VITAMIN D3) 50 MCG
50 TABLET ORAL DAILY
COMMUNITY

## 2025-01-17 RX ORDER — LOSARTAN POTASSIUM AND HYDROCHLOROTHIAZIDE 25; 100 MG/1; MG/1
1 TABLET ORAL DAILY
Qty: 90 TABLET | Refills: 1 | Status: SHIPPED | OUTPATIENT
Start: 2025-01-17 | End: 2025-07-16

## 2025-01-17 RX ORDER — PRAVASTATIN SODIUM 40 MG/1
40 TABLET ORAL DAILY
Qty: 90 TABLET | Refills: 1 | Status: SHIPPED | OUTPATIENT
Start: 2025-01-17 | End: 2025-07-16

## 2025-01-17 RX ORDER — SEMAGLUTIDE 0.68 MG/ML
0.5 INJECTION, SOLUTION SUBCUTANEOUS WEEKLY
Qty: 9 ML | Refills: 1 | Status: SHIPPED | OUTPATIENT
Start: 2025-01-17 | End: 2025-06-28

## 2025-01-17 RX ORDER — FLUOXETINE HYDROCHLORIDE 40 MG/1
40 CAPSULE ORAL DAILY
Qty: 90 CAPSULE | Refills: 1 | Status: SHIPPED | OUTPATIENT
Start: 2025-01-17 | End: 2025-07-16

## 2025-01-17 NOTE — PROGRESS NOTES
Subjective   Patient ID: Haleigh Pretty is a 64 y.o. female who presents for Diabetes, Hypertension, Hyperlipidemia, Anxiety, and Depression.    Past Medical, Surgical, and Family History reviewed and updated in chart.    Reviewed all medications by prescribing practitioner or clinical pharmacist (such as prescriptions, OTCs, herbal therapies and supplements) and documented in the medical record.    HPI  1.  Preventative maintenance.  Mammogram September 2023, willing to have done  Colonoscopy due 2025  Immunizations: Declined Shingrix and Prevnar 20  Work done in anticipation of today's visit noted below  Hair seems to be thinning and she seems to be losing more of it along with thin fingernails  She has tried biotin in the past without benefit     2.  Diabetes.  Currently taking Ozempic 0.5 mg weekly, tolerating well  Recent hemoglobin A1c was excellent at 5.9%    3.  Hyperlipidemia.  Currently taking pravastatin  Not interested in discontinuation of medication currently  Recent lipid panel excellent with total cholesterol 144, LDL 82    4.  Hypertension.  No reported chest pain, shortness of breath or any other cardiac/respiratory concerns  BP normotensive on intake and on repeat    5.  Anxiety and depression.  Fluoxetine 40 mg daily, it has made a significant difference in her overall emotional wellbeing    Review of Systems  All pertinent positive symptoms are included in the history of present illness.    All other systems have been reviewed and are negative and noncontributory to this patient's current ailments.    Past Medical History:   Diagnosis Date    Abnormal findings on diagnostic imaging of other specified body structures     Abnormal radiographic examination    Influenza due to other identified influenza virus with other respiratory manifestations 01/05/2018    Influenza A    Laceration without foreign body of right hand, initial encounter 11/15/2017    Laceration of right hand without foreign body,  initial encounter    Myositis, unspecified 07/02/2015    Myofascitis    Pain in thoracic spine 07/02/2015    Pain in thoracic spine    Personal history of other diseases of the digestive system 07/27/2018    History of fatty infiltration of liver    Personal history of other endocrine, nutritional and metabolic disease     History of hyperlipidemia    Personal history of other specified conditions 08/25/2017    History of palpitations    Sprain of unspecified parts of thorax, initial encounter 07/02/2015    Thoracic sprain    Strain of muscle, fascia and tendon at neck level, initial encounter 11/12/2019    Cervical strain     Past Surgical History:   Procedure Laterality Date    OTHER SURGICAL HISTORY  10/15/2014    Mastectomy For Gynecomastia Bilateral    TONSILLECTOMY  10/15/2014    Tonsillectomy     Social History     Tobacco Use    Smoking status: Never    Smokeless tobacco: Never     Family History   Problem Relation Name Age of Onset    JAKE disease Mother      Lymphoma Father       Immunization History   Administered Date(s) Administered    Flu vaccine (IIV4), preservative free *Check age/dose* 10/21/2021, 10/30/2023    Flu vaccine, quadrivalent, no egg protein, age 6 month or greater (FLUCELVAX) 10/18/2018, 09/20/2019, 10/13/2020    Flu vaccine, trivalent, preservative free, no egg protein, age 6 months or greater (Flucelvax) 11/11/2024    Influenza, seasonal, injectable 10/30/1995, 10/20/1997, 10/24/2022    [employer] SARS-CoV-2 Vaccination 03/17/2021    Moderna SARS-CoV-2 Vaccination 10/24/2022    Pfizer Purple Cap SARS-CoV-2 11/27/2021    Tdap vaccine, age 7 year and older (BOOSTRIX, ADACEL) 01/24/2017, 08/03/2022     Current Outpatient Medications   Medication Instructions    cholecalciferol (VITAMIN D-3) 50 mcg, oral, Daily    Dexcom G7 Sensor device Use as directed, applying 1 sensor every 10 days    FLUoxetine (PROZAC) 40 mg, oral, Daily    losartan-hydrochlorothiazide (Hyzaar) 100-25 mg tablet 1 tablet,  "oral, Daily    Ozempic 0.5 mg, abdominal subcutaneous, Weekly    pravastatin (PRAVACHOL) 40 mg, oral, Daily     Allergies   Allergen Reactions    Propoxyphene N-Acetaminophen Unknown       Objective   Vitals:    01/17/25 1417 01/17/25 1445   BP: 120/72 116/78   Pulse: 67    Weight: 65.3 kg (144 lb)    Height: 1.626 m (5' 4\")      Body mass index is 24.72 kg/m².    BP Readings from Last 3 Encounters:   01/17/25 116/78   05/30/24 120/76   03/21/23 132/80      Wt Readings from Last 3 Encounters:   01/17/25 65.3 kg (144 lb)   05/30/24 63.5 kg (140 lb)   03/21/23 66.2 kg (146 lb)        Lab on 01/15/2025   Component Date Value    Cholesterol 01/15/2025 144     HDL-Cholesterol 01/15/2025 43.6     Cholesterol/HDL Ratio 01/15/2025 3.3     LDL Calculated 01/15/2025 82     VLDL 01/15/2025 19     Triglycerides 01/15/2025 93     Non HDL Cholesterol 01/15/2025 100     Hemoglobin A1C 01/15/2025 5.9 (H)     Estimated Average Glucose 01/15/2025 123     Glucose 01/15/2025 87     Sodium 01/15/2025 136     Potassium 01/15/2025 3.9     Chloride 01/15/2025 100     Bicarbonate 01/15/2025 30     Anion Gap 01/15/2025 10     Urea Nitrogen 01/15/2025 21     Creatinine 01/15/2025 0.69     eGFR 01/15/2025 >90     Calcium 01/15/2025 9.5     Albumin 01/15/2025 4.5     Alkaline Phosphatase 01/15/2025 34     Total Protein 01/15/2025 6.7     AST 01/15/2025 20     Bilirubin, Total 01/15/2025 0.8     ALT 01/15/2025 20     WBC 01/15/2025 5.4     nRBC 01/15/2025 0.0     RBC 01/15/2025 4.84     Hemoglobin 01/15/2025 13.8     Hematocrit 01/15/2025 41.4     MCV 01/15/2025 86     MCH 01/15/2025 28.5     MCHC 01/15/2025 33.3     RDW 01/15/2025 12.2     Platelets 01/15/2025 229      Physical Exam  CONSTITUTIONAL - well nourished, well developed, looks like stated age, in no acute distress, not ill-appearing, and not tired appearing  SKIN - normal skin color and pigmentation, normal skin turgor without rash, lesions, or nodules visualized  HEAD - no trauma, " normocephalic  EYES - pupils are equal and reactive to light, extraocular muscles are intact, and normal external exam  CHEST - clear to auscultation, no wheezing, no crackles and no rales, good effort  CARDIAC - regular rate and regular rhythm, no skipped beats, no murmur  EXTREMITIES - no obvious or evident edema, no obvious or evident deformities  NEUROLOGICAL - normal gait, normal balance, normal motor, no ataxia, alert, oriented and no focal signs  PSYCHIATRIC - alert, pleasant and cordial, age-appropriate    Assessment/Plan   Problem List Items Addressed This Visit       Hyperlipemia     Stable, no changes to medication recommended         Relevant Medications    pravastatin (Pravachol) 40 mg tablet    Hypertension     Stable, no changes to medication recommended  Please consider bringing your home BP cuff to the office to ensure calibration is appropriate because your BPs at home are much higher than we are obtaining here in the office         Relevant Medications    losartan-hydrochlorothiazide (Hyzaar) 100-25 mg tablet    New onset type 2 diabetes mellitus (Multi) - Primary     Stable, no changes to medication recommended         Relevant Medications    losartan-hydrochlorothiazide (Hyzaar) 100-25 mg tablet    pravastatin (Pravachol) 40 mg tablet    semaglutide (Ozempic) 0.25 mg or 0.5 mg (2 mg/3 mL) pen injector    Anxiety and depression     Stable, no changes to medication recommended         Relevant Medications    FLUoxetine (PROzac) 40 mg capsule    Hair thinning     Suggested a Centrum One-A-Day woman's vitamin  Consider the use of oral zinc, biotin, increase protein in the diet  Suspect this could be secondary to the use of Ozempic as I have seen this is a side effect in other patients of mine who take GLP-1 medications         Thin nails     Suggested a Centrum One-A-Day woman's vitamin  Consider the use of oral zinc, biotin, increase protein in the diet          Other Visit Diagnoses        Screening mammogram for breast cancer        Overdue, please schedule at your earliest convenience    Relevant Orders    BI mammo bilateral screening tomosynthesis

## 2025-01-17 NOTE — ASSESSMENT & PLAN NOTE
Suggested a Centrum One-A-Day woman's vitamin  Consider the use of oral zinc, biotin, increase protein in the diet

## 2025-01-17 NOTE — ASSESSMENT & PLAN NOTE
Stable, no changes to medication recommended  Please consider bringing your home BP cuff to the office to ensure calibration is appropriate because your BPs at home are much higher than we are obtaining here in the office

## 2025-01-17 NOTE — ASSESSMENT & PLAN NOTE
Suggested a Centrum One-A-Day woman's vitamin  Consider the use of oral zinc, biotin, increase protein in the diet  Suspect this could be secondary to the use of Ozempic as I have seen this is a side effect in other patients of mine who take GLP-1 medications

## 2025-02-11 ENCOUNTER — HOSPITAL ENCOUNTER (OUTPATIENT)
Dept: RADIOLOGY | Facility: CLINIC | Age: 65
Discharge: HOME | End: 2025-02-11
Payer: COMMERCIAL

## 2025-02-11 VITALS — BODY MASS INDEX: 24.59 KG/M2 | WEIGHT: 144 LBS | HEIGHT: 64 IN

## 2025-02-11 DIAGNOSIS — Z12.31 SCREENING MAMMOGRAM FOR BREAST CANCER: ICD-10-CM

## 2025-02-11 PROCEDURE — 77063 BREAST TOMOSYNTHESIS BI: CPT

## 2025-02-11 PROCEDURE — 77063 BREAST TOMOSYNTHESIS BI: CPT | Performed by: RADIOLOGY

## 2025-02-11 PROCEDURE — 77067 SCR MAMMO BI INCL CAD: CPT | Performed by: RADIOLOGY

## 2025-07-12 DIAGNOSIS — I10 PRIMARY HYPERTENSION: ICD-10-CM

## 2025-07-12 DIAGNOSIS — E78.2 MIXED HYPERLIPIDEMIA: ICD-10-CM

## 2025-07-12 DIAGNOSIS — E11.9 NEW ONSET TYPE 2 DIABETES MELLITUS (MULTI): ICD-10-CM

## 2025-07-12 DIAGNOSIS — F32.A ANXIETY AND DEPRESSION: ICD-10-CM

## 2025-07-12 DIAGNOSIS — F41.9 ANXIETY AND DEPRESSION: ICD-10-CM

## 2025-07-14 RX ORDER — PRAVASTATIN SODIUM 40 MG/1
40 TABLET ORAL DAILY
Qty: 90 TABLET | Refills: 1 | OUTPATIENT
Start: 2025-07-14

## 2025-07-14 RX ORDER — LOSARTAN POTASSIUM AND HYDROCHLOROTHIAZIDE 25; 100 MG/1; MG/1
1 TABLET ORAL DAILY
Qty: 90 TABLET | Refills: 1 | OUTPATIENT
Start: 2025-07-14

## 2025-07-14 RX ORDER — FLUOXETINE HYDROCHLORIDE 40 MG/1
40 CAPSULE ORAL DAILY
Qty: 90 CAPSULE | Refills: 1 | OUTPATIENT
Start: 2025-07-14

## 2025-07-24 RX ORDER — LOSARTAN POTASSIUM AND HYDROCHLOROTHIAZIDE 25; 100 MG/1; MG/1
1 TABLET ORAL DAILY
Qty: 30 TABLET | Refills: 0 | Status: SHIPPED | OUTPATIENT
Start: 2025-07-24 | End: 2025-08-23

## 2025-07-24 RX ORDER — FLUOXETINE HYDROCHLORIDE 40 MG/1
40 CAPSULE ORAL DAILY
Qty: 30 CAPSULE | Refills: 0 | Status: SHIPPED | OUTPATIENT
Start: 2025-07-24 | End: 2025-08-23

## 2025-07-24 RX ORDER — SEMAGLUTIDE 0.68 MG/ML
0.5 INJECTION, SOLUTION SUBCUTANEOUS WEEKLY
Qty: 3 ML | Refills: 0 | Status: SHIPPED | OUTPATIENT
Start: 2025-07-24 | End: 2025-08-15

## 2025-07-24 RX ORDER — PRAVASTATIN SODIUM 40 MG/1
40 TABLET ORAL DAILY
Qty: 30 TABLET | Refills: 0 | Status: SHIPPED | OUTPATIENT
Start: 2025-07-24 | End: 2025-08-23

## 2025-08-27 ENCOUNTER — APPOINTMENT (OUTPATIENT)
Dept: PRIMARY CARE | Facility: CLINIC | Age: 65
End: 2025-08-27
Payer: COMMERCIAL

## 2025-08-27 VITALS
HEART RATE: 99 BPM | RESPIRATION RATE: 16 BRPM | BODY MASS INDEX: 25.44 KG/M2 | SYSTOLIC BLOOD PRESSURE: 131 MMHG | DIASTOLIC BLOOD PRESSURE: 81 MMHG | HEIGHT: 64 IN | WEIGHT: 149 LBS

## 2025-08-27 DIAGNOSIS — I10 PRIMARY HYPERTENSION: ICD-10-CM

## 2025-08-27 DIAGNOSIS — F32.A ANXIETY AND DEPRESSION: ICD-10-CM

## 2025-08-27 DIAGNOSIS — E78.2 MIXED HYPERLIPIDEMIA: ICD-10-CM

## 2025-08-27 DIAGNOSIS — E11.9 NEW ONSET TYPE 2 DIABETES MELLITUS (MULTI): ICD-10-CM

## 2025-08-27 DIAGNOSIS — F41.9 ANXIETY AND DEPRESSION: ICD-10-CM

## 2025-08-27 DIAGNOSIS — Z00.00 PHYSICAL EXAM: ICD-10-CM

## 2025-08-27 DIAGNOSIS — E11.9 TYPE 2 DIABETES MELLITUS WITHOUT COMPLICATION, WITHOUT LONG-TERM CURRENT USE OF INSULIN: Primary | ICD-10-CM

## 2025-08-27 PROCEDURE — 3008F BODY MASS INDEX DOCD: CPT | Performed by: FAMILY MEDICINE

## 2025-08-27 PROCEDURE — 3048F LDL-C <100 MG/DL: CPT | Performed by: FAMILY MEDICINE

## 2025-08-27 PROCEDURE — 1036F TOBACCO NON-USER: CPT | Performed by: FAMILY MEDICINE

## 2025-08-27 PROCEDURE — 99214 OFFICE O/P EST MOD 30 MIN: CPT | Performed by: FAMILY MEDICINE

## 2025-08-27 PROCEDURE — 3075F SYST BP GE 130 - 139MM HG: CPT | Performed by: FAMILY MEDICINE

## 2025-08-27 PROCEDURE — 3044F HG A1C LEVEL LT 7.0%: CPT | Performed by: FAMILY MEDICINE

## 2025-08-27 PROCEDURE — 3079F DIAST BP 80-89 MM HG: CPT | Performed by: FAMILY MEDICINE

## 2025-08-27 RX ORDER — PRAVASTATIN SODIUM 40 MG/1
40 TABLET ORAL DAILY
Qty: 90 TABLET | Refills: 3 | Status: SHIPPED | OUTPATIENT
Start: 2025-08-27 | End: 2026-08-27

## 2025-08-27 RX ORDER — TIRZEPATIDE 2.5 MG/.5ML
2.5 INJECTION, SOLUTION SUBCUTANEOUS WEEKLY
Qty: 4 PEN | Refills: 1 | Status: SHIPPED | OUTPATIENT
Start: 2025-08-27 | End: 2025-10-22

## 2025-08-27 RX ORDER — LOSARTAN POTASSIUM AND HYDROCHLOROTHIAZIDE 25; 100 MG/1; MG/1
1 TABLET ORAL DAILY
Qty: 90 TABLET | Refills: 3 | Status: SHIPPED | OUTPATIENT
Start: 2025-08-27 | End: 2026-08-27

## 2025-08-27 RX ORDER — FLUOXETINE HYDROCHLORIDE 40 MG/1
40 CAPSULE ORAL DAILY
Qty: 90 CAPSULE | Refills: 3 | Status: SHIPPED | OUTPATIENT
Start: 2025-08-27 | End: 2026-08-27